# Patient Record
Sex: FEMALE | Race: BLACK OR AFRICAN AMERICAN | Employment: FULL TIME | URBAN - METROPOLITAN AREA
[De-identification: names, ages, dates, MRNs, and addresses within clinical notes are randomized per-mention and may not be internally consistent; named-entity substitution may affect disease eponyms.]

---

## 2017-04-05 ENCOUNTER — ED HISTORICAL/CONVERTED ENCOUNTER (OUTPATIENT)
Dept: OTHER | Age: 52
End: 2017-04-05

## 2017-10-27 ENCOUNTER — ED HISTORICAL/CONVERTED ENCOUNTER (OUTPATIENT)
Dept: OTHER | Age: 52
End: 2017-10-27

## 2018-09-09 ENCOUNTER — ED HISTORICAL/CONVERTED ENCOUNTER (OUTPATIENT)
Dept: OTHER | Age: 53
End: 2018-09-09

## 2019-08-17 ENCOUNTER — ED HISTORICAL/CONVERTED ENCOUNTER (OUTPATIENT)
Dept: OTHER | Age: 54
End: 2019-08-17

## 2022-09-22 ENCOUNTER — APPOINTMENT (OUTPATIENT)
Dept: GENERAL RADIOLOGY | Age: 57
End: 2022-09-22
Attending: STUDENT IN AN ORGANIZED HEALTH CARE EDUCATION/TRAINING PROGRAM
Payer: MEDICAID

## 2022-09-22 ENCOUNTER — HOSPITAL ENCOUNTER (EMERGENCY)
Age: 57
Discharge: HOME | End: 2022-09-22
Attending: STUDENT IN AN ORGANIZED HEALTH CARE EDUCATION/TRAINING PROGRAM
Payer: MEDICAID

## 2022-09-22 VITALS
SYSTOLIC BLOOD PRESSURE: 217 MMHG | HEART RATE: 76 BPM | BODY MASS INDEX: 35.85 KG/M2 | HEIGHT: 64 IN | TEMPERATURE: 99.1 F | DIASTOLIC BLOOD PRESSURE: 127 MMHG | RESPIRATION RATE: 16 BRPM | OXYGEN SATURATION: 100 % | WEIGHT: 210 LBS

## 2022-09-22 DIAGNOSIS — M54.2 MUSCULOSKELETAL NECK PAIN: Primary | ICD-10-CM
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
## 2022-09-22 DIAGNOSIS — R03.0 ELEVATED BLOOD PRESSURE READING: ICD-10-CM

## 2022-09-22 DIAGNOSIS — M54.6 ACUTE RIGHT-SIDED THORACIC BACK PAIN: ICD-10-CM

## 2022-09-22 PROCEDURE — 71046 X-RAY EXAM CHEST 2 VIEWS: CPT

## 2022-09-22 PROCEDURE — 93005 ELECTROCARDIOGRAM TRACING: CPT

## 2022-09-22 PROCEDURE — 99284 EMERGENCY DEPT VISIT MOD MDM: CPT

## 2022-09-22 PROCEDURE — 74011250637 HC RX REV CODE- 250/637: Performed by: STUDENT IN AN ORGANIZED HEALTH CARE EDUCATION/TRAINING PROGRAM

## 2022-09-22 PROCEDURE — 74011000250 HC RX REV CODE- 250: Performed by: STUDENT IN AN ORGANIZED HEALTH CARE EDUCATION/TRAINING PROGRAM

## 2022-09-22 RX ORDER — ACETAMINOPHEN 325 MG/1
975 TABLET ORAL
Status: COMPLETED | OUTPATIENT
Start: 2022-09-22 | End: 2022-09-22

## 2022-09-22 RX ORDER — LIDOCAINE 4 G/100G
1 PATCH TOPICAL EVERY 24 HOURS
Status: DISCONTINUED | OUTPATIENT
Start: 2022-09-22 | End: 2022-09-22 | Stop reason: HOSPADM

## 2022-09-22 RX ADMIN — ACETAMINOPHEN 975 MG: 325 TABLET, FILM COATED ORAL at 16:03
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
## 2022-09-23 LAB
ATRIAL RATE: 60 BPM
CALCULATED P AXIS, ECG09: 28 DEGREES
CALCULATED R AXIS, ECG10: -12 DEGREES
CALCULATED T AXIS, ECG11: 4 DEGREES
DIAGNOSIS, 93000: NORMAL
P-R INTERVAL, ECG05: 160 MS
Q-T INTERVAL, ECG07: 436 MS
QRS DURATION, ECG06: 100 MS
QTC CALCULATION (BEZET), ECG08: 436 MS
VENTRICULAR RATE, ECG03: 60 BPM

## 2022-09-26 ENCOUNTER — HOSPITAL ENCOUNTER (EMERGENCY)
Age: 57
Discharge: HOME | End: 2022-09-26
Attending: STUDENT IN AN ORGANIZED HEALTH CARE EDUCATION/TRAINING PROGRAM
Payer: MEDICAID

## 2022-09-26 VITALS
TEMPERATURE: 98 F | HEART RATE: 50 BPM | BODY MASS INDEX: 35.51 KG/M2 | DIASTOLIC BLOOD PRESSURE: 92 MMHG | SYSTOLIC BLOOD PRESSURE: 163 MMHG | HEIGHT: 64 IN | WEIGHT: 208 LBS | RESPIRATION RATE: 18 BRPM | OXYGEN SATURATION: 97 %

## 2022-09-26 DIAGNOSIS — Z91.14 NONCOMPLIANCE WITH MEDICATION REGIMEN: ICD-10-CM

## 2022-09-26 DIAGNOSIS — I10 HYPERTENSION, UNSPECIFIED TYPE: Primary | ICD-10-CM

## 2022-09-26 PROCEDURE — 99283 EMERGENCY DEPT VISIT LOW MDM: CPT

## 2022-09-26 PROCEDURE — 36415 COLL VENOUS BLD VENIPUNCTURE: CPT

## 2022-09-26 PROCEDURE — 74011250637 HC RX REV CODE- 250/637: Performed by: NURSE PRACTITIONER

## 2022-09-26 PROCEDURE — 74011000250 HC RX REV CODE- 250: Performed by: NURSE PRACTITIONER

## 2022-09-26 RX ORDER — CLONIDINE HYDROCHLORIDE 0.1 MG/1
0.1 TABLET ORAL
Status: COMPLETED | OUTPATIENT
Start: 2022-09-26 | End: 2022-09-26

## 2022-09-26 RX ORDER — LIDOCAINE 4 G/100G
1 PATCH TOPICAL EVERY 24 HOURS
Status: DISCONTINUED | OUTPATIENT
Start: 2022-09-26 | End: 2022-09-27 | Stop reason: HOSPADM

## 2022-09-26 RX ORDER — LIDOCAINE 50 MG/G
PATCH TOPICAL
Qty: 1 EACH | Refills: 0 | Status: SHIPPED | OUTPATIENT
Start: 2022-09-26

## 2022-09-26 RX ORDER — LISINOPRIL 10 MG/1
10 TABLET ORAL DAILY
Qty: 30 TABLET | Refills: 0 | Status: SHIPPED | OUTPATIENT
Start: 2022-09-26 | End: 2022-10-26

## 2022-09-26 RX ORDER — ACETAMINOPHEN 325 MG/1
650 TABLET ORAL
Status: COMPLETED | OUTPATIENT
Start: 2022-09-26 | End: 2022-09-26

## 2022-09-26 RX ADMIN — CLONIDINE HYDROCHLORIDE 0.1 MG: 0.1 TABLET ORAL at 21:33

## 2022-09-26 RX ADMIN — ACETAMINOPHEN 650 MG: 325 TABLET, FILM COATED ORAL at 21:22

## 2022-09-27 NOTE — DISCHARGE INSTRUCTIONS
Check your blood pressure once daily 1-2 hours after your blood pressure medications and record. Follow-up with your primary doctor in 1-2 weeks for blood pressure recheck.
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
## 2022-10-18 ENCOUNTER — HOSPITAL ENCOUNTER (OUTPATIENT)
Dept: PHYSICAL THERAPY | Age: 57
Discharge: HOME | End: 2022-10-18
Payer: MEDICAID

## 2022-10-18 PROCEDURE — 97161 PT EVAL LOW COMPLEX 20 MIN: CPT | Performed by: PHYSICAL THERAPIST
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
## 2022-11-02 ENCOUNTER — HOSPITAL ENCOUNTER (OUTPATIENT)
Dept: PHYSICAL THERAPY | Age: 57
Discharge: HOME | End: 2022-11-02
Payer: MEDICAID

## 2022-11-02 PROCEDURE — 97014 ELECTRIC STIMULATION THERAPY: CPT

## 2022-11-02 PROCEDURE — 97110 THERAPEUTIC EXERCISES: CPT

## 2022-11-02 NOTE — PROGRESS NOTES
PT DAILY TREATMENT NOTE     Patient Name: Uzma Sunshine  Date:2022  : 1965  [x]  Patient  Verified  Payor: [employer]ICA[employer] / Plan: [address] [place] [address] / Product Type: Managed Care Medicaid /    Treatment Area: Other low back pain [M54.59]  Cervicalgia [M54.2]  Sprain of ligaments of lumbar spine, subsequent encounter [S33. 5XXD]   Next MD APPT:   In time:2:30    Out time:3:20  Total Treatment Time (min): 50  Total Timed Codes (min): 45  1:1 Treatment Time ( [address] only): 39   Visit #:     SUBJECTIVE    Any medication changes, allergies to medications, adverse drug reactions, diagnosis change, or new procedure performed?:   [x] No    [] Yes (see summary sheet for update)    Pain Level (0-10 scale) pre treatment: 8                    Pain Level (0-10 scale) post treatment: 7.5    Subjective functional status/changes:   [] No changes reported  Pt reports tightness and pain. Pt states she has attempted to be more mobile by taking her grand kids to school.    OBJECTIVE    Modality rationale: decrease inflammation, decrease pain, increase tissue extensibility, and increase muscle contraction/control to improve the patients ability to reduce pain   Min Type Additional Details   15 [x] Estim: [x]UnAtt   []Att       []TENS instruct                  [x]IFC  []Premod   []NMES                     []Other:  []w/US   []w/ice   [x]w/heat  Position: seated  Location: UT, rhomboid    []  Ice     []  Heat  []  Ice massage Position:  Location:    []  Traction: [] Cervical       []Lumbar                       [] Prone          []Supine                       []Intermittent   []Continuous Lbs:  []w/heat  []W/heat and Estim    []  Ultrasound: []Continuous   [] Pulsed at:                           []1MHz   []3MHz Location:  W/cm2:      [x] Skin assessment post-treatment:   [x]intact  []redness- no adverse reaction   []redness - adverse reaction:     35 min Therapeutic Exercise:  [] See flow sheet : Rationale: increase ROM, increase strength, and improve coordination to improve the patients ability to move without pain   min Manual Therapy:     Rationale: decrease pain, increase ROM, increase tissue extensibility, and decrease trigger points to improve the patients ability to being pain free     min Neuromuscular Re-education:  []  See flow sheet :   Rationale: increase ROM, increase strength, and improve coordination  to improve the patients ability to being pain free      With   [] TE   [] TA   [] Neuro   [] SC   [] other: Patient Education: [] Review HEP    [] Progressed/Changed HEP based on:   [] positioning   [] body mechanics   [] transfers   [] Use of heat/ice    [] other:          Other Objective/Functional Measures:      ASSESSMENT/Changes in Function:   Pt continues to demonstrate antalgic, very slow lia gait pattern with no trunk rotation and arm swing. During session pt was able to complete TE with limited cs, and thoracic ROM and mobility due to pain. Concluded session with E-stim IFC accompanied with HP on UT to alleviate pain symptoms. Pt noted minimal pain reduction. Attempted MT with no avail, causing pain to increase and cause pt to guard shoulders. Stopped MT shortly after. Patient will continue to benefit from skilled PT services to modify and progress therapeutic interventions, address functional mobility deficits, address ROM deficits, address strength deficits, analyze and address soft tissue restrictions, analyze and cue movement patterns, analyze and modify body mechanics/ergonomics, and assess and modify postural abnormalities to attain remaining goals. GOALS/Progress towards goals:  Short Term Goals: To be accomplished in 4 weeks:              1.  Inpd with HEP              2.  Decrease pain to < 5/10 without routine ADL   Long Term Goals: To be accomplished in 8 weeks:              1.  Normal gait on even surfaces. 2. Pain < 2/10 with routine ADL. 3. Full AROM entire spine. Patient Goal (s): get back to being pain free.    PLAN  [x]  Continue plan of care  []  Upgrade activities as tolerated       []  Update interventions per flow sheet       []  Discharge due to:  []  Other:     Ami Nicole, ELEUTERIO 11/2/2022

## 2022-11-08 ENCOUNTER — HOSPITAL ENCOUNTER (OUTPATIENT)
Dept: PHYSICAL THERAPY | Age: 57
Discharge: HOME | End: 2022-11-08
Payer: MEDICAID

## 2022-11-08 PROCEDURE — 97110 THERAPEUTIC EXERCISES: CPT

## 2022-11-08 PROCEDURE — 97014 ELECTRIC STIMULATION THERAPY: CPT

## 2022-11-08 NOTE — PROGRESS NOTES
PT DAILY TREATMENT NOTE     Patient Name: Baljeet Jackson  Date:2022  : 1965  [x]  Patient  Verified  Payor: [employer] / Plan: [address] [place] [address] / Product Type: Managed Care Medicaid /    Treatment Area: Other low back pain [M54.59]  Cervicalgia [M54.2]  Sprain of ligaments of lumbar spine, subsequent encounter [S33. 5XXD]   Next MD APPT:   In time: 245 pm Out time: 340 pm  Total Treatment Time (min): 55  Total Timed Codes (min): 26  1:1 Treatment Time ( only): 426  Visit #: 316    SUBJECTIVE    Any medication changes, allergies to medications, adverse drug reactions, diagnosis change, or new procedure performed?:   [x] No    [] Yes (see summary sheet for update)    Pain Level (0-10 scale) pre treatment: 8                    Pain Level (0-10 scale) post treatment: 7.5    Subjective functional status/changes:   [] No changes reported  Pain is bad.   Difficulty moving  OBJECTIVE    Modality rationale: decrease inflammation, decrease pain, increase tissue extensibility, and increase muscle contraction/control to improve the patients ability to reduce pain   Min Type Additional Details   20 [x] Estim: [x]UnAtt   []Att       []TENS instruct                  [x]IFC  []Premod   []NMES                     []Other:  []w/US   []w/ice   [x]w/heat  Position: seated  Location: UT, rhomboid    []  Ice     []  Heat  []  Ice massage Position:  Location:    []  Traction: [] Cervical       []Lumbar                       [] Prone          []Supine                       []Intermittent   []Continuous Lbs:  []w/heat  []W/heat and Estim    []  Ultrasound: []Continuous   [] Pulsed at:                           []1MHz   []3MHz Location:  W/cm2:      [x] Skin assessment post-treatment:   [x]intact  []redness- no adverse reaction   []redness - adverse reaction:     26 min Therapeutic Exercise:  [] See flow sheet :   Rationale: increase ROM, increase strength, and improve coordination to improve the patients ability to move without pain   min Manual Therapy:     Rationale: decrease pain, increase ROM, increase tissue extensibility, and decrease trigger points to improve the patients ability to being pain free     min Neuromuscular Re-education:  []  See flow sheet :   Rationale: increase ROM, increase strength, and improve coordination  to improve the patients ability to being pain free      With   [] TE   [] TA   [] Neuro   [] SC   [] other: Patient Education: [] Review HEP    [] Progressed/Changed HEP based on:   [] positioning   [] body mechanics   [] transfers   [] Use of heat/ice    [] other:          Other Objective/Functional Measures:      ASSESSMENT/Changes in Function:   Pt continues to demonstrate antalgic, very slow lia gait pattern with no trunk rotation and arm swing. During session pt was able to complete TE with limited cs, and thoracic ROM and mobility due to pain. Concluded session with E-stim IFC accompanied with HP on UT to alleviate pain symptoms. Pt noted minimal pain reduction. Patient will continue to benefit from skilled PT services to modify and progress therapeutic interventions, address functional mobility deficits, address ROM deficits, address strength deficits, analyze and address soft tissue restrictions, analyze and cue movement patterns, analyze and modify body mechanics/ergonomics, and assess and modify postural abnormalities to attain remaining goals. GOALS/Progress towards goals:  Short Term Goals: To be accomplished in 4 weeks:              1.  Inpd with HEP              2.  Decrease pain to < 5/10 without routine ADL   Long Term Goals: To be accomplished in 8 weeks:              1.  Normal gait on even surfaces. 2. Pain < 2/10 with routine ADL. 3. Full AROM entire spine. Patient Goal (s): get back to being pain free.    PLAN  [x]  Continue plan of care  []  Upgrade activities as tolerated       []  Update interventions per flow sheet       []  Discharge due to:  []  Other:     Yamile Covarrubias, PT 11/8/2022

## 2022-11-10 ENCOUNTER — HOSPITAL ENCOUNTER (OUTPATIENT)
Dept: PHYSICAL THERAPY | Age: 57
Discharge: HOME | End: 2022-11-10
Payer: MEDICAID

## 2022-11-10 PROCEDURE — 97110 THERAPEUTIC EXERCISES: CPT

## 2022-11-10 NOTE — PROGRESS NOTES
PT DAILY TREATMENT NOTE     Patient Name: Ross Urban  Date:11/10/2022  : 1965  [x]  Patient  Verified  Payor: [employer] / Plan: [address] [place] [address] / Product Type: Managed Care Medicaid /    Treatment Area: Other low back pain [M54.59]  Cervicalgia [M54.2]  Sprain of ligaments of lumbar spine, subsequent encounter [S33. 5XXD]   Next MD APPT:   In time: 330pm Out time: 410 pm  Total Treatment Time (min): 40  Total Timed Codes (min): 40  1:1 Treatment Time ([facility] only): 40  Visit #: 4    SUBJECTIVE    Any medication changes, allergies to medications, adverse drug reactions, diagnosis change, or new procedure performed?:   [x] No    [] Yes (see summary sheet for update)    Pain Level (0-10 scale) pre treatment: 8                    Pain Level (0-10 scale) post treatment: 8    Subjective functional status/changes:   [] No changes reported  \"LAST SESSION WAS TOO HARD; LEFT ME SORE. \"  OBJECTIVE    Modality rationale: decrease inflammation, decrease pain, increase tissue extensibility, and increase muscle contraction/control to improve the patients ability to reduce pain   Min Type Additional Details    [x] Estim: [x]UnAtt   []Att       []TENS instruct                  [x]IFC  []Premod   []NMES                     []Other:  []w/US   []w/ice   [x]w/heat  Position: seated  Location: UT, rhomboid    []  Ice     []  Heat  []  Ice massage Position:  Location:    []  Traction: [] Cervical       []Lumbar                       [] Prone          []Supine                       []Intermittent   []Continuous Lbs:  []w/heat  []W/heat and Estim    []  Ultrasound: []Continuous   [] Pulsed at:                           []1MHz   []3MHz Location:  W/cm2:      [x] Skin assessment post-treatment:   [x]intact  []redness- no adverse reaction   []redness - adverse reaction:     40 min Therapeutic Exercise:  [] See flow sheet :   Rationale: increase ROM, increase strength, and improve coordination to improve the patients ability to move without pain   min Manual Therapy:     Rationale: decrease pain, increase ROM, increase tissue extensibility, and decrease trigger points to improve the patients ability to being pain free     min Neuromuscular Re-education:  []  See flow sheet :   Rationale: increase ROM, increase strength, and improve coordination  to improve the patients ability to being pain free      With   [] TE   [] TA   [] Neuro   [] SC   [] other: Patient Education: [] Review HEP    [] Progressed/Changed HEP based on:   [] positioning   [] body mechanics   [] transfers   [] Use of heat/ice    [] other:          Other Objective/Functional Measures:      ASSESSMENT/Changes in Function   Pt continues to demonstrate antalgic, very slow lia gait pattern with no trunk rotation and arm swing. During session pt was able to complete exercises slowly. Patient will continue to benefit from skilled PT services to modify and progress therapeutic interventions, address functional mobility deficits, address ROM deficits, address strength deficits, analyze and address soft tissue restrictions, analyze and cue movement patterns, analyze and modify body mechanics/ergonomics, and assess and modify postural abnormalities to attain remaining goals. GOALS/Progress towards goals:  Short Term Goals: To be accomplished in 4 weeks:              1.  Inpd with HEP              2.  Decrease pain to < 5/10 without routine ADL   Long Term Goals: To be accomplished in 8 weeks:              1.  Normal gait on even surfaces. 2. Pain < 2/10 with routine ADL. 3. Full AROM entire spine. Patient Goal (s): get back to being pain free.    PLAN  [x]  Continue plan of care  []  Upgrade activities as tolerated       []  Update interventions per flow sheet       []  Discharge due to:  []  Other:     Sonido Arguello, PT 11/10/2022

## 2022-11-14 ENCOUNTER — HOSPITAL ENCOUNTER (OUTPATIENT)
Dept: PHYSICAL THERAPY | Age: 57
Discharge: HOME | End: 2022-11-14
Payer: MEDICAID

## 2022-11-14 PROCEDURE — 97110 THERAPEUTIC EXERCISES: CPT

## 2022-11-14 PROCEDURE — 97014 ELECTRIC STIMULATION THERAPY: CPT

## 2022-11-14 NOTE — PROGRESS NOTES
PT DAILY TREATMENT NOTE     Patient Name: Marysol Fink  Date:2022  : 1965  [x]  Patient  Verified  Payor: [employer] / Plan: [address] [place] [address] / Product Type: Managed Care Medicaid /    Treatment Area: Other low back pain [M54.59]  Cervicalgia [M54.2]  Sprain of ligaments of lumbar spine, subsequent encounter [S33. 5XXD]   Next MD APPT:   In time: 230pm   Out time: 320 pm  Total Treatment Time (min): 50  Total Timed Codes (min): 38  1:1 Treatment Time ([facility] only): 38  Visit #: 5    SUBJECTIVE    Any medication changes, allergies to medications, adverse drug reactions, diagnosis change, or new procedure performed?:   [x] No    [] Yes (see summary sheet for update)    Pain Level (0-10 scale) pre treatment: 8                    Pain Level (0-10 scale) post treatment: 7    Subjective functional status/changes:   [] No changes reported  \"LAST SESSION WAS TOO HARD; LEFT ME SORE. \"    OBJECTIVE    Modality rationale: decrease inflammation, decrease pain, increase tissue extensibility, and increase muscle contraction/control to improve the patients ability to reduce pain   Min Type Additional Details   10 [x] Estim: [x]UnAtt   []Att       []TENS instruct                  [x]IFC  []Premod   []NMES                     []Other:  []w/US   []w/ice   [x]w/heat  Position: seated  Location: UT, rhomboid    []  Ice     []  Heat  []  Ice massage Position:  Location:    []  Traction: [] Cervical       []Lumbar                       [] Prone          []Supine                       []Intermittent   []Continuous Lbs:  []w/heat  []W/heat and Estim    []  Ultrasound: []Continuous   [] Pulsed at:                           []1MHz   []3MHz Location:  W/cm2:      [x] Skin assessment post-treatment:   [x]intact  []redness- no adverse reaction   []redness - adverse reaction:     28 min Therapeutic Exercise:  [] See flow sheet :   Rationale: increase ROM, increase strength, and improve coordination to improve the patients ability to move without pain   min Manual Therapy:     Rationale: decrease pain, increase ROM, increase tissue extensibility, and decrease trigger points to improve the patients ability to being pain free     min Neuromuscular Re-education:  []  See flow sheet :   Rationale: increase ROM, increase strength, and improve coordination  to improve the patients ability to being pain free      With   [] TE   [] TA   [] Neuro   [] SC   [] other: Patient Education: [] Review HEP    [] Progressed/Changed HEP based on:   [] positioning   [] body mechanics   [] transfers   [] Use of heat/ice    [] other:          Other Objective/Functional Measures:      ASSESSMENT/Changes in Function   Pt continues to demonstrate antalgic, very slow lia gait pattern with no trunk rotation and arm swing. During session pt was able to complete exercises slowly. Patient will continue to benefit from skilled PT services to modify and progress therapeutic interventions, address functional mobility deficits, address ROM deficits, address strength deficits, analyze and address soft tissue restrictions, analyze and cue movement patterns, analyze and modify body mechanics/ergonomics, and assess and modify postural abnormalities to attain remaining goals. GOALS/Progress towards goals:  Short Term Goals: To be accomplished in 4 weeks:              1.  Inpd with HEP              2.  Decrease pain to < 5/10 without routine ADL   Long Term Goals: To be accomplished in 8 weeks:              1.  Normal gait on even surfaces. 2. Pain < 2/10 with routine ADL. 3. Full AROM entire spine. Patient Goal (s): get back to being pain free.    PLAN  [x]  Continue plan of care  []  Upgrade activities as tolerated       []  Update interventions per flow sheet       []  Discharge due to:  []  Other:     Sidney Encinas, PTA 11/14/2022

## 2022-11-16 ENCOUNTER — HOSPITAL ENCOUNTER (OUTPATIENT)
Dept: PHYSICAL THERAPY | Age: 57
Discharge: HOME | End: 2022-11-16
Payer: MEDICAID

## 2022-11-16 PROCEDURE — 97110 THERAPEUTIC EXERCISES: CPT

## 2022-11-16 PROCEDURE — 97014 ELECTRIC STIMULATION THERAPY: CPT

## 2022-11-16 NOTE — PROGRESS NOTES
PT DAILY TREATMENT NOTE     Patient Name: Ana Paula Ma  Date:2022  : 1965  [x]  Patient  Verified  Payor: [employer]ICA[employer] / Plan: [address] [place] [address] / Product Type: Managed Care Medicaid /    Treatment Area: Other low back pain [M54.59]  Cervicalgia [M54.2]  Sprain of ligaments of lumbar spine, subsequent encounter [S33. 5XXD]   Next MD APPT:   In time: 230pm   Out time: 320 pm  Total Treatment Time (min): 50  Total Timed Codes (min): 40  1:1 Treatment Time ([facility] only): 40  Visit #: 6    SUBJECTIVE    Any medication changes, allergies to medications, adverse drug reactions, diagnosis change, or new procedure performed?:   [x] No    [] Yes (see summary sheet for update)    Pain Level (0-10 scale) pre treatment: 7                Pain Level (0-10 scale) post treatment: 7    Subjective functional status/changes:   [] No changes reported  Pt states that she is feeling a little bit better since beginning PT, and that she can feel a little progress being made. Pt reports that she is at 80% improvement, and that she still has some pain which is limiting her from reaching 100%.        OBJECTIVE    Modality rationale: decrease inflammation, decrease pain, increase tissue extensibility, and increase muscle contraction/control to improve the patients ability to reduce pain   Min Type Additional Details   10 [x] Estim: [x]UnAtt   []Att       []TENS instruct                  [x]IFC  []Premod   []NMES                     []Other:  []w/US   []w/ice   [x]w/heat  Position: seated  Location: UT, rhomboid    []  Ice     []  Heat  []  Ice massage Position:  Location:    []  Traction: [] Cervical       []Lumbar                       [] Prone          []Supine                       []Intermittent   []Continuous Lbs:  []w/heat  []W/heat and Estim    []  Ultrasound: []Continuous   [] Pulsed at:                           []1MHz   []3MHz Location:  W/cm2:      [x] Skin assessment post-treatment:   [x]intact []redness- no adverse reaction   []redness - adverse reaction:     30 min Therapeutic Exercise:  [x] See flow sheet :   Rationale: increase ROM, increase strength, and improve coordination to improve the patients ability to move without pain   min Manual Therapy:     Rationale: decrease pain, increase ROM, increase tissue extensibility, and decrease trigger points to improve the patients ability to being pain free     min Neuromuscular Re-education:  []  See flow sheet :   Rationale: increase ROM, increase strength, and improve coordination  to improve the patients ability to being pain free      With   [] TE   [] TA   [] Neuro   [] SC   [] other: Patient Education: [] Review HEP    [] Progressed/Changed HEP based on:   [] positioning   [] body mechanics   [] transfers   [] Use of heat/ice    [] other:          Other Objective/Functional Measures:    AMPAC: 35.72%    ASSESSMENT/Changes in Function   Pt has completed six sessions of PT and is making steady progress. Pt has been completing her HEP daily, which has helped with her progress. Pt continues to ambulate with an antalgic gait, and reduced arm swing. Patient will continue to benefit from skilled PT services to modify and progress therapeutic interventions, address functional mobility deficits, address ROM deficits, address strength deficits, analyze and address soft tissue restrictions, analyze and cue movement patterns, analyze and modify body mechanics/ergonomics, and assess and modify postural abnormalities to attain remaining goals. GOALS/Progress towards goals:  Short Term Goals: To be accomplished in 4 weeks:              1.  Inpd with HEP Met 11/16              2.  Decrease pain to < 5/10 without routine ADL   Long Term Goals: To be accomplished in 8 weeks:              1.  Normal gait on even surfaces. 2. Pain < 2/10 with routine ADL. 3. Full AROM entire spine. Patient Goal (s): get back to being pain free.   PLAN  [x]  Continue plan of care  []  Upgrade activities as tolerated       []  Update interventions per flow sheet       []  Discharge due to:  []  Other:     Terry Chang., PTA 11/16/2022

## 2022-11-21 ENCOUNTER — HOSPITAL ENCOUNTER (OUTPATIENT)
Dept: PHYSICAL THERAPY | Age: 57
Discharge: HOME | End: 2022-11-21
Payer: MEDICAID

## 2022-11-21 PROCEDURE — 97014 ELECTRIC STIMULATION THERAPY: CPT

## 2022-11-21 PROCEDURE — 97110 THERAPEUTIC EXERCISES: CPT

## 2022-11-21 NOTE — PROGRESS NOTES
PT DAILY TREATMENT NOTE     Patient Name: Anna Fields  Date:2022  : 1965  [x]  Patient  Verified  Payor: [employer]ICAID / Plan: [address] [place] [address] / Product Type: Managed Care Medicaid /    Treatment Area: Other low back pain [M54.59]  Cervicalgia [M54.2]  Sprain of ligaments of lumbar spine, subsequent encounter [S33. 5XXD]   Next MD APPT:   In time: 230pm   Out time: 330 pm  Total Treatment Time (min): 60  Total Timed Codes (min): 48  1:1 Treatment Time ([facility] only): 48  Visit #: 7    SUBJECTIVE    Any medication changes, allergies to medications, adverse drug reactions, diagnosis change, or new procedure performed?:   [x] No    [] Yes (see summary sheet for update)    Pain Level (0-10 scale) pre treatment: 7                Pain Level (0-10 scale) post treatment: 7    Subjective functional status/changes:   [] No changes reported  Pt reports that she is having less symptoms within her neck and that her low back is more bothersome.        OBJECTIVE    Modality rationale: decrease inflammation, decrease pain, increase tissue extensibility, and increase muscle contraction/control to improve the patients ability to reduce pain   Min Type Additional Details   15 [x] Estim: [x]UnAtt   []Att       []TENS instruct                  [x]IFC  []Premod   []NMES                     []Other:  []w/US   []w/ice   [x]w/heat  Position: seated  Location: lumbar paraspinals    []  Ice     []  Heat  []  Ice massage Position:  Location:    []  Traction: [] Cervical       []Lumbar                       [] Prone          []Supine                       []Intermittent   []Continuous Lbs:  []w/heat  []W/heat and Estim    []  Ultrasound: []Continuous   [] Pulsed at:                           []1MHz   []3MHz Location:  W/cm2:      [x] Skin assessment post-treatment:   [x]intact  []redness- no adverse reaction   []redness - adverse reaction:     33 min Therapeutic Exercise:  [x] See flow sheet :   Rationale: increase ROM, increase strength, and improve coordination to improve the patients ability to move without pain   min Manual Therapy:     Rationale: decrease pain, increase ROM, increase tissue extensibility, and decrease trigger points to improve the patients ability to being pain free     min Neuromuscular Re-education:  []  See flow sheet :   Rationale: increase ROM, increase strength, and improve coordination  to improve the patients ability to being pain free      With   [] TE   [] TA   [] Neuro   [] SC   [] other: Patient Education: [] Review HEP    [] Progressed/Changed HEP based on:   [] positioning   [] body mechanics   [] transfers   [] Use of heat/ice    [] other:          Other Objective/Functional Measures:        ASSESSMENT/Changes in Function   Pt tolerated session without any increase of symptoms. Pt required TC and VC to perform pelvic tilts, and was able to complete activity without further cueing. Pt given HEP and was able to verbalize understanding of completion once demo was provided. Patient will continue to benefit from skilled PT services to modify and progress therapeutic interventions, address functional mobility deficits, address ROM deficits, address strength deficits, analyze and address soft tissue restrictions, analyze and cue movement patterns, analyze and modify body mechanics/ergonomics, and assess and modify postural abnormalities to attain remaining goals. GOALS/Progress towards goals:  Short Term Goals: To be accomplished in 4 weeks:              1.  Inpd with HEP Met 11/16              2.  Decrease pain to < 5/10 without routine ADL   Long Term Goals: To be accomplished in 8 weeks:              1.  Normal gait on even surfaces. 2. Pain < 2/10 with routine ADL. 3. Full AROM entire spine. Patient Goal (s): get back to being pain free.    PLAN  [x]  Continue plan of care  []  Upgrade activities as tolerated       []  Update interventions per flow sheet       []  Discharge due to:  []  Other:     Kristin Garza., PTA 11/21/2022

## 2022-11-23 ENCOUNTER — HOSPITAL ENCOUNTER (OUTPATIENT)
Dept: PHYSICAL THERAPY | Age: 57
Discharge: HOME | End: 2022-11-23
Payer: MEDICAID

## 2022-11-23 PROCEDURE — 97014 ELECTRIC STIMULATION THERAPY: CPT

## 2022-11-23 PROCEDURE — 97110 THERAPEUTIC EXERCISES: CPT

## 2022-11-23 NOTE — PROGRESS NOTES
PT DAILY TREATMENT NOTE     Patient Name: Ana Paula Ma  Date:2022  : 1965  [x]  Patient  Verified  Payor: [employer] / Plan: [address] [place] [address] / Product Type: Managed Care Medicaid /    Treatment Area: Other low back pain [M54.59]  Cervicalgia [M54.2]  Sprain of ligaments of lumbar spine, subsequent encounter [S33. 5XXD]   Next MD APPT:   In time: 230pm   Out time: 320 pm  Total Treatment Time (min): 50  Total Timed Codes (min): 44  1:1 Treatment Time (MC only): 40  Visit #: 8    SUBJECTIVE    Any medication changes, allergies to medications, adverse drug reactions, diagnosis change, or new procedure performed?:   [x] No    [] Yes (see summary sheet for update)    Pain Level (0-10 scale) pre treatment: 7                Pain Level (0-10 scale) post treatment: 7    Subjective functional status/changes:   [] No changes reported  Pt reports that she is having less symptoms within her neck and that her low back is more bothersome.        OBJECTIVE    Modality rationale: decrease inflammation, decrease pain, increase tissue extensibility, and increase muscle contraction/control to improve the patients ability to reduce pain   Min Type Additional Details   15 [x] Estim: [x]UnAtt   []Att       []TENS instruct                  [x]IFC  []Premod   []NMES                     []Other:  []w/US   []w/ice   [x]w/heat  Position: seated  Location: lumbar paraspinals    []  Ice     []  Heat  []  Ice massage Position:  Location:    []  Traction: [] Cervical       []Lumbar                       [] Prone          []Supine                       []Intermittent   []Continuous Lbs:  []w/heat  []W/heat and Estim    []  Ultrasound: []Continuous   [] Pulsed at:                           []1MHz   []3MHz Location:  W/cm2:      [x] Skin assessment post-treatment:   [x]intact  []redness- no adverse reaction   []redness - adverse reaction:     29 min Therapeutic Exercise:  [x] See flow sheet :   Rationale: increase ROM, increase strength, and improve coordination to improve the patients ability to move without pain   min Manual Therapy:     Rationale: decrease pain, increase ROM, increase tissue extensibility, and decrease trigger points to improve the patients ability to being pain free     min Neuromuscular Re-education:  []  See flow sheet :   Rationale: increase ROM, increase strength, and improve coordination  to improve the patients ability to being pain free      With   [] TE   [] TA   [] Neuro   [] SC   [] other: Patient Education: [] Review HEP    [] Progressed/Changed HEP based on:   [] positioning   [] body mechanics   [] transfers   [] Use of heat/ice    [] other:          Other Objective/Functional Measures:        ASSESSMENT/Changes in Function   Pt tolerated session without any increase of symptoms. Pt required TC and VC to perform pelvic tilts, and was able to complete activity without further cueing. Pt given HEP and was able to verbalize understanding of completion once demo was provided. Patient will continue to benefit from skilled PT services to modify and progress therapeutic interventions, address functional mobility deficits, address ROM deficits, address strength deficits, analyze and address soft tissue restrictions, analyze and cue movement patterns, analyze and modify body mechanics/ergonomics, and assess and modify postural abnormalities to attain remaining goals. GOALS/Progress towards goals:  Short Term Goals: To be accomplished in 4 weeks:              1.  Inpd with HEP Met 11/16              2.  Decrease pain to < 5/10 without routine ADL   Long Term Goals: To be accomplished in 8 weeks:              1.  Normal gait on even surfaces. 2. Pain < 2/10 with routine ADL. 3. Full AROM entire spine. Patient Goal (s): get back to being pain free.    PLAN  [x]  Continue plan of care  []  Upgrade activities as tolerated       []  Update interventions per flow sheet       []  Discharge due to:  []  Other:     Cortes Gallegos, PTA 11/23/2022

## 2022-11-29 ENCOUNTER — HOSPITAL ENCOUNTER (OUTPATIENT)
Dept: PHYSICAL THERAPY | Age: 57
End: 2022-11-29
Payer: MEDICAID

## 2022-11-29 PROCEDURE — 97014 ELECTRIC STIMULATION THERAPY: CPT

## 2022-11-29 PROCEDURE — 97110 THERAPEUTIC EXERCISES: CPT

## 2022-11-29 NOTE — PROGRESS NOTES
PT DAILY TREATMENT NOTE     Patient Name: Aleja Del Real  Date:2022  : 1965  [x]  Patient  Verified  Payor: [employer] / Plan: [address] [place] [address] / Product Type: Managed Care Medicaid /    Treatment Area: Other low back pain [M54.59]  Cervicalgia [M54.2]  Sprain of ligaments of lumbar spine, subsequent encounter [S33. 5XXD]   Next MD APPT:   In time: 230pm   Out time: 325 pm  Total Treatment Time (min): 55  Total Timed Codes (min): 43  1:1 Treatment Time (MC only): 37  Visit #: 9    SUBJECTIVE    Any medication changes, allergies to medications, adverse drug reactions, diagnosis change, or new procedure performed?:   [x] No    [] Yes (see summary sheet for update)    Pain Level (0-10 scale) pre treatment: 7                Pain Level (0-10 scale) post treatment: 6    Subjective functional status/changes:   [] No changes reported  Pt states that she climbed a lot of steps over the weekend and is a little sore today.      OBJECTIVE    Modality rationale: decrease inflammation, decrease pain, increase tissue extensibility, and increase muscle contraction/control to improve the patients ability to reduce pain   Min Type Additional Details   15 [x] Estim: [x]UnAtt   []Att       []TENS instruct                  [x]IFC  []Premod   []NMES                     []Other:  []w/US   []w/ice   [x]w/heat  Position: seated  Location: lumbar paraspinals    []  Ice     []  Heat  []  Ice massage Position:  Location:    []  Traction: [] Cervical       []Lumbar                       [] Prone          []Supine                       []Intermittent   []Continuous Lbs:  []w/heat  []W/heat and Estim    []  Ultrasound: []Continuous   [] Pulsed at:                           []1MHz   []3MHz Location:  W/cm2:      [x] Skin assessment post-treatment:   [x]intact  []redness- no adverse reaction   []redness - adverse reaction:     28 min Therapeutic Exercise:  [x] See flow sheet :   Rationale: increase ROM, increase strength, and improve coordination to improve the patients ability to move without pain   min Manual Therapy:     Rationale: decrease pain, increase ROM, increase tissue extensibility, and decrease trigger points to improve the patients ability to being pain free     min Neuromuscular Re-education:  []  See flow sheet :   Rationale: increase ROM, increase strength, and improve coordination  to improve the patients ability to being pain free      With   [] TE   [] TA   [] Neuro   [] SC   [] other: Patient Education: [] Review HEP    [] Progressed/Changed HEP based on:   [] positioning   [] body mechanics   [] transfers   [] Use of heat/ice    [] other:          Other Objective/Functional Measures:        ASSESSMENT/Changes in Function   Pt tolerated session without any increase of symptoms. Pt able to perform new activities to increase lumbar ROM, and was able to complete them without any difficulty or increase of pain. Patient will continue to benefit from skilled PT services to modify and progress therapeutic interventions, address functional mobility deficits, address ROM deficits, address strength deficits, analyze and address soft tissue restrictions, analyze and cue movement patterns, analyze and modify body mechanics/ergonomics, and assess and modify postural abnormalities to attain remaining goals. GOALS/Progress towards goals:  Short Term Goals: To be accomplished in 4 weeks:              1.  Inpd with HEP Met 11/16              2.  Decrease pain to < 5/10 without routine ADL   Long Term Goals: To be accomplished in 8 weeks:              1.  Normal gait on even surfaces. 2. Pain < 2/10 with routine ADL. 3. Full AROM entire spine. Patient Goal (s): get back to being pain free.    PLAN  [x]  Continue plan of care  []  Upgrade activities as tolerated       []  Update interventions per flow sheet       []  Discharge due to:  []  Other:     Jeff Yee., PTA 11/29/2022

## 2022-12-02 ENCOUNTER — HOSPITAL ENCOUNTER (OUTPATIENT)
Dept: PHYSICAL THERAPY | Age: 57
Discharge: HOME | End: 2022-12-02
Payer: MEDICAID

## 2022-12-02 PROCEDURE — 97014 ELECTRIC STIMULATION THERAPY: CPT

## 2022-12-02 PROCEDURE — 97110 THERAPEUTIC EXERCISES: CPT

## 2022-12-02 NOTE — PROGRESS NOTES
PT DAILY TREATMENT NOTE     Patient Name: Justine Matos  Date:2022  : 1965  [x]  Patient  Verified  Payor: [employer] / Plan: [address] [place] [address] / Product Type: Managed Care Medicaid /    Treatment Area: Other low back pain [M54.59]  Cervicalgia [M54.2]  Sprain of ligaments of lumbar spine, subsequent encounter [S33. 5XXD]   Next MD APPT:   In time: 230pm   Out time: 330 pm  Total Treatment Time (min): 60  Total Timed Codes (min): 54  1:1 Treatment Time (MC only): 47  Visit #: 10    SUBJECTIVE    Any medication changes, allergies to medications, adverse drug reactions, diagnosis change, or new procedure performed?:   [x] No    [] Yes (see summary sheet for update)    Pain Level (0-10 scale) pre treatment: 6               Pain Level (0-10 scale) post treatment: 5    Subjective functional status/changes:   [] No changes reported  Pt reports that her neck is bothering her more today, but says she has been running errands all day.      OBJECTIVE    Modality rationale: decrease inflammation, decrease pain, increase tissue extensibility, and increase muscle contraction/control to improve the patients ability to reduce pain   Min Type Additional Details   20 [x] Estim: [x]UnAtt   []Att       []TENS instruct                  []IFC  [x]Premod   []NMES                     []Other:  []w/US   []w/ice   [x]w/heat  Position: seated  Location: lumbar paraspinals, B UT    []  Ice     []  Heat  []  Ice massage Position:  Location:    []  Traction: [] Cervical       []Lumbar                       [] Prone          []Supine                       []Intermittent   []Continuous Lbs:  []w/heat  []W/heat and Estim    []  Ultrasound: []Continuous   [] Pulsed at:                           []1MHz   []3MHz Location:  W/cm2:      [x] Skin assessment post-treatment:   [x]intact  []redness- no adverse reaction   []redness - adverse reaction:     34 min Therapeutic Exercise:  [x] See flow sheet : Rationale: increase ROM, increase strength, and improve coordination to improve the patients ability to move without pain   min Manual Therapy:     Rationale: decrease pain, increase ROM, increase tissue extensibility, and decrease trigger points to improve the patients ability to being pain free     min Neuromuscular Re-education:  []  See flow sheet :   Rationale: increase ROM, increase strength, and improve coordination  to improve the patients ability to being pain free      With   [] TE   [] TA   [] Neuro   [] SC   [] other: Patient Education: [] Review HEP    [] Progressed/Changed HEP based on:   [] positioning   [] body mechanics   [] transfers   [] Use of heat/ice    [] other:          Other Objective/Functional Measures:        ASSESSMENT/Changes in Function   Pt tolerated session without any increase of symptoms. Pt able to perform new activities to increase lumbar ROM, and was able to complete them without any difficulty or increase of pain. Patient will continue to benefit from skilled PT services to modify and progress therapeutic interventions, address functional mobility deficits, address ROM deficits, address strength deficits, analyze and address soft tissue restrictions, analyze and cue movement patterns, analyze and modify body mechanics/ergonomics, and assess and modify postural abnormalities to attain remaining goals. GOALS/Progress towards goals:  Short Term Goals: To be accomplished in 4 weeks:              1.  Inpd with HEP Met 11/16              2.  Decrease pain to < 5/10 without routine ADL   Long Term Goals: To be accomplished in 8 weeks:              1.  Normal gait on even surfaces. 2. Pain < 2/10 with routine ADL. 3. Full AROM entire spine. Patient Goal (s): get back to being pain free.    PLAN  [x]  Continue plan of care  []  Upgrade activities as tolerated       []  Update interventions per flow sheet       []  Discharge due to:  []  Other: Robles Jackson., PTA 12/2/2022

## 2022-12-05 ENCOUNTER — APPOINTMENT (OUTPATIENT)
Dept: PHYSICAL THERAPY | Age: 57
End: 2022-12-05
Payer: MEDICAID

## 2022-12-07 ENCOUNTER — HOSPITAL ENCOUNTER (OUTPATIENT)
Dept: PHYSICAL THERAPY | Age: 57
Discharge: HOME | End: 2022-12-07
Payer: MEDICAID

## 2022-12-07 PROCEDURE — 97110 THERAPEUTIC EXERCISES: CPT

## 2022-12-07 PROCEDURE — 97014 ELECTRIC STIMULATION THERAPY: CPT

## 2022-12-07 NOTE — PROGRESS NOTES
PT DAILY TREATMENT NOTE     Patient Name: Baljeet Jackson  Date:2022  : 1965  [x]  Patient  Verified  Payor: [employer] / Plan: [address] [place] [address] / Product Type: Managed Care Medicaid /    Treatment Area: Other low back pain [M54.59]  Cervicalgia [M54.2]  Sprain of ligaments of lumbar spine, subsequent encounter [S33. 5XXD]   Next MD APPT:   In time: 230pm   Out time: 335 pm  Total Treatment Time (min): 65  Total Timed Codes (min): 44  1:1 Treatment Time (MC only): 40  Visit #: 11    SUBJECTIVE    Any medication changes, allergies to medications, adverse drug reactions, diagnosis change, or new procedure performed?:   [x] No    [] Yes (see summary sheet for update)    Pain Level (0-10 scale) pre treatment: 5             Pain Level (0-10 scale) post treatment: 4    Subjective functional status/changes:   [] No changes reported  Pt states that she is feeling a little better.      OBJECTIVE    Modality rationale: decrease inflammation, decrease pain, increase tissue extensibility, and increase muscle contraction/control to improve the patients ability to reduce pain   Min Type Additional Details   15 [x] Estim: [x]UnAtt   []Att       []TENS instruct                  []IFC  [x]Premod   []NMES                     []Other:  []w/US   []w/ice   [x]w/heat  Position: seated  Location: lumbar paraspinals, B UT    []  Ice     []  Heat  []  Ice massage Position:  Location:    []  Traction: [] Cervical       []Lumbar                       [] Prone          []Supine                       []Intermittent   []Continuous Lbs:  []w/heat  []W/heat and Estim    []  Ultrasound: []Continuous   [] Pulsed at:                           []1MHz   []3MHz Location:  W/cm2:      [x] Skin assessment post-treatment:   [x]intact  []redness- no adverse reaction   []redness - adverse reaction:     29 min Therapeutic Exercise:  [x] See flow sheet :   Rationale: increase ROM, increase strength, and improve coordination to improve the patients ability to move without pain   min Manual Therapy:     Rationale: decrease pain, increase ROM, increase tissue extensibility, and decrease trigger points to improve the patients ability to being pain free     min Neuromuscular Re-education:  []  See flow sheet :   Rationale: increase ROM, increase strength, and improve coordination  to improve the patients ability to being pain free      With   [] TE   [] TA   [] Neuro   [] SC   [] other: Patient Education: [] Review HEP    [] Progressed/Changed HEP based on:   [] positioning   [] body mechanics   [] transfers   [] Use of heat/ice    [] other:          Other Objective/Functional Measures:        ASSESSMENT/Changes in Function   Pt tolerated session without any increase of symptoms. Pt able to perform new activities to increase lumbar ROM, and was able to complete them without any difficulty or increase of pain. Patient will continue to benefit from skilled PT services to modify and progress therapeutic interventions, address functional mobility deficits, address ROM deficits, address strength deficits, analyze and address soft tissue restrictions, analyze and cue movement patterns, analyze and modify body mechanics/ergonomics, and assess and modify postural abnormalities to attain remaining goals. GOALS/Progress towards goals:  Short Term Goals: To be accomplished in 4 weeks:              1.  Inpd with HEP Met 11/16              2.  Decrease pain to < 5/10 without routine ADL   Long Term Goals: To be accomplished in 8 weeks:              1.  Normal gait on even surfaces. 2. Pain < 2/10 with routine ADL. 3. Full AROM entire spine. Patient Goal (s): get back to being pain free.    PLAN  [x]  Continue plan of care  []  Upgrade activities as tolerated       []  Update interventions per flow sheet       []  Discharge due to:  []  Other:     Sabrina Garcia, PTA 12/7/2022

## 2022-12-12 ENCOUNTER — HOSPITAL ENCOUNTER (OUTPATIENT)
Dept: PHYSICAL THERAPY | Age: 57
Discharge: HOME | End: 2022-12-12
Payer: MEDICAID

## 2022-12-12 PROCEDURE — 97014 ELECTRIC STIMULATION THERAPY: CPT

## 2022-12-12 PROCEDURE — 97110 THERAPEUTIC EXERCISES: CPT

## 2022-12-12 NOTE — PROGRESS NOTES
PT DAILY TREATMENT NOTE     Patient Name: [place] Garcia  Date:2022  : 1965  [x]  Patient  Verified  Payor: [employer] / Plan: [address] [place] [address] / Product Type: Managed Care Medicaid /    Treatment Area: Other low back pain [M54.59]  Cervicalgia [M54.2]  Sprain of ligaments of lumbar spine, subsequent encounter [S33. 5XXD]   Next MD APPT:   In time: 230pm   Out time: 335 pm  Total Treatment Time (min): 65  Total Timed Codes (min): 57  1:1 Treatment Time (MC only): 62  Visit #: 12    SUBJECTIVE    Any medication changes, allergies to medications, adverse drug reactions, diagnosis change, or new procedure performed?:   [x] No    [] Yes (see summary sheet for update)    Pain Level (0-10 scale) pre treatment: 4           Pain Level (0-10 scale) post treatment: 3    Subjective functional status/changes:   [] No changes reported  Pt states that she is feeling a little better.      OBJECTIVE    Modality rationale: decrease inflammation, decrease pain, increase tissue extensibility, and increase muscle contraction/control to improve the patients ability to reduce pain   Min Type Additional Details   20 [x] Estim: [x]UnAtt   []Att       []TENS instruct                  []IFC  [x]Premod   []NMES                     []Other:  []w/US   []w/ice   [x]w/heat  Position: seated  Location: lumbar paraspinals, B UT    []  Ice     []  Heat  []  Ice massage Position:  Location:    []  Traction: [] Cervical       []Lumbar                       [] Prone          []Supine                       []Intermittent   []Continuous Lbs:  []w/heat  []W/heat and Estim    []  Ultrasound: []Continuous   [] Pulsed at:                           []1MHz   []3MHz Location:  W/cm2:      [x] Skin assessment post-treatment:   [x]intact  []redness- no adverse reaction   []redness - adverse reaction:     37 min Therapeutic Exercise:  [x] See flow sheet :   Rationale: increase ROM, increase strength, and improve coordination to improve the patients ability to move without pain   min Manual Therapy:     Rationale: decrease pain, increase ROM, increase tissue extensibility, and decrease trigger points to improve the patients ability to being pain free     min Neuromuscular Re-education:  []  See flow sheet :   Rationale: increase ROM, increase strength, and improve coordination  to improve the patients ability to being pain free      With   [] TE   [] TA   [] Neuro   [] SC   [] other: Patient Education: [] Review HEP    [] Progressed/Changed HEP based on:   [] positioning   [] body mechanics   [] transfers   [] Use of heat/ice    [] other:          Other Objective/Functional Measures:        ASSESSMENT/Changes in Function   Pt tolerated session without any increase of symptoms. Pt able to perform new activities to increase lumbar ROM, and was able to complete them without any difficulty or increase of pain. Patient will continue to benefit from skilled PT services to modify and progress therapeutic interventions, address functional mobility deficits, address ROM deficits, address strength deficits, analyze and address soft tissue restrictions, analyze and cue movement patterns, analyze and modify body mechanics/ergonomics, and assess and modify postural abnormalities to attain remaining goals. GOALS/Progress towards goals:  Short Term Goals: To be accomplished in 4 weeks:              1.  Inpd with HEP Met 11/16              2.  Decrease pain to < 5/10 without routine ADL   Long Term Goals: To be accomplished in 8 weeks:              1.  Normal gait on even surfaces. 2. Pain < 2/10 with routine ADL. 3. Full AROM entire spine. Patient Goal (s): get back to being pain free.    PLAN  [x]  Continue plan of care  []  Upgrade activities as tolerated       []  Update interventions per flow sheet       []  Discharge due to:  []  Other:     Amy Garcia., PTA 12/12/2022

## 2022-12-14 ENCOUNTER — HOSPITAL ENCOUNTER (OUTPATIENT)
Dept: PHYSICAL THERAPY | Age: 57
Discharge: HOME | End: 2022-12-14
Payer: MEDICAID

## 2022-12-14 PROCEDURE — 97014 ELECTRIC STIMULATION THERAPY: CPT

## 2022-12-14 PROCEDURE — 97110 THERAPEUTIC EXERCISES: CPT

## 2022-12-19 ENCOUNTER — HOSPITAL ENCOUNTER (OUTPATIENT)
Dept: PHYSICAL THERAPY | Age: 57
End: 2022-12-19
Payer: MEDICAID

## 2022-12-19 PROCEDURE — 97110 THERAPEUTIC EXERCISES: CPT

## 2022-12-19 NOTE — PROGRESS NOTES
PT DAILY TREATMENT NOTE     Patient Name: Randy Alfaro  Date:2022  : 1965  [x]  Patient  Verified  Payor: [employer]ICA[employer] / Plan: [address] [place] [address] / Product Type: Managed Care Medicaid /    Treatment Area: Other low back pain [M54.59]  Cervicalgia [M54.2]  Sprain of ligaments of lumbar spine, subsequent encounter [S33. 5XXD]   Next MD APPT:   In time: 230pm   Out time: 315 pm  Total Treatment Time (min): 45  Total Timed Codes (min): 38  1:1 Treatment Time (MC only): 38  Visit #: 14    SUBJECTIVE    Any medication changes, allergies to medications, adverse drug reactions, diagnosis change, or new procedure performed?:   [x] No    [] Yes (see summary sheet for update)    Pain Level (0-10 scale) pre treatment: 1          Pain Level (0-10 scale) post treatment: 0    Subjective functional status/changes:   [] No changes reported  Pt states that she is feeling a little better.      OBJECTIVE    Modality rationale: decrease inflammation, decrease pain, increase tissue extensibility, and increase muscle contraction/control to improve the patients ability to reduce pain   Min Type Additional Details    [] Estim: []UnAtt   []Att       []TENS instruct                  []IFC  []Premod   []NMES                     []Other:  []w/US   []w/ice   []w/heat  Position: seated  Location: lumbar paraspinals, B UT    []  Ice     []  Heat  []  Ice massage Position:  Location:    []  Traction: [] Cervical       []Lumbar                       [] Prone          []Supine                       []Intermittent   []Continuous Lbs:  []w/heat  []W/heat and Estim    []  Ultrasound: []Continuous   [] Pulsed at:                           []1MHz   []3MHz Location:  W/cm2:      [] Skin assessment post-treatment:   []intact  []redness- no adverse reaction   []redness - adverse reaction:     38 min Therapeutic Exercise:  [x] See flow sheet :   Rationale: increase ROM, increase strength, and improve coordination to improve the patients ability to move without pain   min Manual Therapy:     Rationale: decrease pain, increase ROM, increase tissue extensibility, and decrease trigger points to improve the patients ability to being pain free     min Neuromuscular Re-education:  []  See flow sheet :   Rationale: increase ROM, increase strength, and improve coordination  to improve the patients ability to being pain free      With   [] TE   [] TA   [] Neuro   [] SC   [] other: Patient Education: [] Review HEP    [] Progressed/Changed HEP based on:   [] positioning   [] body mechanics   [] transfers   [] Use of heat/ice    [] other:          Other Objective/Functional Measures:        ASSESSMENT/Changes in Function   Pt tolerated session without any increase of symptoms. Pt able to increase speed and duration on treadmill without any increase of symptoms. Will speak with supervising PT regarding discharge within the next few sessions. Patient will continue to benefit from skilled PT services to modify and progress therapeutic interventions, address functional mobility deficits, address ROM deficits, address strength deficits, analyze and address soft tissue restrictions, analyze and cue movement patterns, analyze and modify body mechanics/ergonomics, and assess and modify postural abnormalities to attain remaining goals. GOALS/Progress towards goals:  Short Term Goals: To be accomplished in 4 weeks:              1.  Inpd with HEP Met 11/16              2.  Decrease pain to < 5/10 without routine ADL   Long Term Goals: To be accomplished in 8 weeks:              1.  Normal gait on even surfaces. 2. Pain < 2/10 with routine ADL. 3. Full AROM entire spine. Patient Goal (s): get back to being pain free.    PLAN  [x]  Continue plan of care  []  Upgrade activities as tolerated       []  Update interventions per flow sheet       []  Discharge due to:  []  Other:     Aries Mccrary, PTA 12/19/2022

## 2022-12-21 ENCOUNTER — HOSPITAL ENCOUNTER (OUTPATIENT)
Dept: PHYSICAL THERAPY | Age: 57
End: 2022-12-21
Payer: MEDICAID

## 2022-12-21 PROCEDURE — 97110 THERAPEUTIC EXERCISES: CPT

## 2022-12-21 NOTE — PROGRESS NOTES
PT DAILY TREATMENT NOTE     Patient Name: Kimberley Chen  Date:2022  : 1965  [x]  Patient  Verified  Payor: [employer]ICA[employer] / Plan: [address] [place] [address] / Product Type: Managed Care Medicaid /    Treatment Area: Other low back pain [M54.59]  Cervicalgia [M54.2]  Sprain of ligaments of lumbar spine, subsequent encounter [S33. 5XXD]   Next MD APPT:   In time: 230pm   Out time: 315 pm  Total Treatment Time (min): 45  Total Timed Codes (min): 40  1:1 Treatment Time (MC only): 40  Visit #: 15    SUBJECTIVE    Any medication changes, allergies to medications, adverse drug reactions, diagnosis change, or new procedure performed?:   [x] No    [] Yes (see summary sheet for update)    Pain Level (0-10 scale) pre treatment: 0          Pain Level (0-10 scale) post treatment: 0    Subjective functional status/changes:   [] No changes reported  Pt states that she is feeling a little better.      OBJECTIVE    Modality rationale: decrease inflammation, decrease pain, increase tissue extensibility, and increase muscle contraction/control to improve the patients ability to reduce pain   Min Type Additional Details    [] Estim: []UnAtt   []Att       []TENS instruct                  []IFC  []Premod   []NMES                     []Other:  []w/US   []w/ice   []w/heat  Position: seated  Location: lumbar paraspinals, B UT    []  Ice     []  Heat  []  Ice massage Position:  Location:    []  Traction: [] Cervical       []Lumbar                       [] Prone          []Supine                       []Intermittent   []Continuous Lbs:  []w/heat  []W/heat and Estim    []  Ultrasound: []Continuous   [] Pulsed at:                           []1MHz   []3MHz Location:  W/cm2:      [] Skin assessment post-treatment:   []intact  []redness- no adverse reaction   []redness - adverse reaction:     40 min Therapeutic Exercise:  [x] See flow sheet :   Rationale: increase ROM, increase strength, and improve coordination to improve the patients ability to move without pain   min Manual Therapy:     Rationale: decrease pain, increase ROM, increase tissue extensibility, and decrease trigger points to improve the patients ability to being pain free     min Neuromuscular Re-education:  []  See flow sheet :   Rationale: increase ROM, increase strength, and improve coordination  to improve the patients ability to being pain free      With   [] TE   [] TA   [] Neuro   [] SC   [] other: Patient Education: [] Review HEP    [] Progressed/Changed HEP based on:   [] positioning   [] body mechanics   [] transfers   [] Use of heat/ice    [] other:          Other Objective/Functional Measures:        ASSESSMENT/Changes in Function   Pt tolerated session without any increase of symptoms. Pt able to increase speed and duration on treadmill without any increase of symptoms. Will speak with supervising PT regarding discharge within the next few sessions. Patient will continue to benefit from skilled PT services to modify and progress therapeutic interventions, address functional mobility deficits, address ROM deficits, address strength deficits, analyze and address soft tissue restrictions, analyze and cue movement patterns, analyze and modify body mechanics/ergonomics, and assess and modify postural abnormalities to attain remaining goals. GOALS/Progress towards goals:  Short Term Goals: To be accomplished in 4 weeks:              1.  Inpd with HEP Met 11/16              2.  Decrease pain to < 5/10 without routine ADL   Long Term Goals: To be accomplished in 8 weeks:              1.  Normal gait on even surfaces. 2. Pain < 2/10 with routine ADL. 3. Full AROM entire spine. Patient Goal (s): get back to being pain free.    PLAN  [x]  Continue plan of care  []  Upgrade activities as tolerated       []  Update interventions per flow sheet       []  Discharge due to:  []  Other:     Jeff Darden., PTA 12/21/2022

## 2022-12-27 ENCOUNTER — APPOINTMENT (OUTPATIENT)
Dept: PHYSICAL THERAPY | Age: 57
End: 2022-12-27
Payer: MEDICAID

## 2022-12-27 PROCEDURE — 97110 THERAPEUTIC EXERCISES: CPT

## 2022-12-27 NOTE — PROGRESS NOTES
PT DAILY TREATMENT NOTE     Patient Name: Marcus Choe  Date:2022  : 1965  [x]  Patient  Verified  Payor: [employer] / Plan: [address] [place] [address] / Product Type: Managed Care Medicaid /    Treatment Area: Other low back pain [M54.59]  Cervicalgia [M54.2]  Sprain of ligaments of lumbar spine, subsequent encounter [S33. 5XXD]   Next MD APPT:   In time: 230pm   Out time: 315 pm  Total Treatment Time (min): 45  Total Timed Codes (min): 40  1:1 Treatment Time (MC only): 40  Visit #: 16    SUBJECTIVE    Any medication changes, allergies to medications, adverse drug reactions, diagnosis change, or new procedure performed?:   [x] No    [] Yes (see summary sheet for update)    Pain Level (0-10 scale) pre treatment: 0          Pain Level (0-10 scale) post treatment: 0    Subjective functional status/changes:   [] No changes reported  Pt states that she is feeling a little better.      OBJECTIVE    Modality rationale: decrease inflammation, decrease pain, increase tissue extensibility, and increase muscle contraction/control to improve the patients ability to reduce pain   Min Type Additional Details    [] Estim: []UnAtt   []Att       []TENS instruct                  []IFC  []Premod   []NMES                     []Other:  []w/US   []w/ice   []w/heat  Position: seated  Location: lumbar paraspinals, B UT    []  Ice     []  Heat  []  Ice massage Position:  Location:    []  Traction: [] Cervical       []Lumbar                       [] Prone          []Supine                       []Intermittent   []Continuous Lbs:  []w/heat  []W/heat and Estim    []  Ultrasound: []Continuous   [] Pulsed at:                           []1MHz   []3MHz Location:  W/cm2:      [] Skin assessment post-treatment:   []intact  []redness- no adverse reaction   []redness - adverse reaction:     40 min Therapeutic Exercise:  [x] See flow sheet :   Rationale: increase ROM, increase strength, and improve coordination to improve the patients ability to move without pain   min Manual Therapy:     Rationale: decrease pain, increase ROM, increase tissue extensibility, and decrease trigger points to improve the patients ability to being pain free     min Neuromuscular Re-education:  []  See flow sheet :   Rationale: increase ROM, increase strength, and improve coordination  to improve the patients ability to being pain free      With   [] TE   [] TA   [] Neuro   [] SC   [] other: Patient Education: [] Review HEP    [] Progressed/Changed HEP based on:   [] positioning   [] body mechanics   [] transfers   [] Use of heat/ice    [] other:          Other Objective/Functional Measures:    [facility]: 16.05% (improvement)      ASSESSMENT/Changes in Function   Pt has completed all sessions of PT and has met all of her goals. Pt will be returning back to work beginning next week and has no difficulty completing her ADLs. Will speak with supervising PT regarding discharge. Patient will continue to benefit from skilled PT services to modify and progress therapeutic interventions, address functional mobility deficits, address ROM deficits, address strength deficits, analyze and address soft tissue restrictions, analyze and cue movement patterns, analyze and modify body mechanics/ergonomics, and assess and modify postural abnormalities to attain remaining goals. GOALS/Progress towards goals:  Short Term Goals: To be accomplished in 4 weeks:              1.  Inpd with HEP Met 11/16              2.  Decrease pain to < 5/10 without routine ADL Met 12/27  Long Term Goals: To be accomplished in 8 weeks:              1.  Normal gait on even surfaces. Met 12/27              2. Pain < 2/10 with routine ADL. Met 12/27              3. Full AROM entire spine. Met 12/27  Patient Goal (s): get back to being pain free.    PLAN  [x]  Continue plan of care  []  Upgrade activities as tolerated       []  Update interventions per flow sheet       [] Discharge due to:  []  Other:     Marion Lester, PTA 12/27/2022

## 2022-12-28 NOTE — PROGRESS NOTES
[address], [address] [facility], [place], operated by [facility]  Phone: [phone]   Fax: [phone]    Discharge Summary  2-15    Patient name: Abdullahi Turner  : 1965  Provider#: [id number]  Referral source: Jimmy Pope MD      Medical/Treatment Diagnosis: Other low back pain [M54.59]  Cervicalgia [M54.2]  Sprain of ligaments of lumbar spine, subsequent encounter [S33. 5XXD]     Prior Hospitalization: see medical history     Comorbidities: See Plan of Care  Prior Level of Function:See Plan of Care  Medications: Verified on Patient Summary List    Start of Care: 10/18/2022      Onset Date:2022   Visits from Start of Care: 15     Missed Visits: 0  Reporting Period : 10/18/2022 to 2023      ASSESSMENT/SUMMARY OF CARE:   Core/LE strengthening, flexibility/ROM TL spine. [facility] Functional Measure: 16%/100  Discharge  84%/100  Intake  GOALS/Progress towards goals:  Short Term Goals: To be accomplished in 4 weeks:              1.  Inpd with HEP Met               2.  Decrease pain to < 5/10 without routine ADL Met   Long Term Goals: To be accomplished in 8 weeks:              1.  Normal gait on even surfaces. Met               2. Pain < 2/10 with routine ADL. Met               3. Full AROM entire spine. Met   Patient Goal (s): get back to being pain free.    PLAN    RECOMMENDATIONS:  [x]Discontinue therapy: [x]Patient has reached or is progressing toward set goals      []Patient is non-compliant or has abdicated      []Due to lack of appreciable progress towards set goals      []Other    April Gentry-Candace, PT 2022

## 2024-10-09 ENCOUNTER — HOSPITAL ENCOUNTER (EMERGENCY)
Facility: HOSPITAL | Age: 59
Discharge: HOME | End: 2024-10-10
Attending: EMERGENCY MEDICINE
Payer: MEDICAID

## 2024-10-09 DIAGNOSIS — I10 HYPERTENSION, UNSPECIFIED TYPE: Primary | ICD-10-CM

## 2024-10-09 DIAGNOSIS — R51.9 ACUTE INTRACTABLE HEADACHE, UNSPECIFIED HEADACHE TYPE: ICD-10-CM

## 2024-10-09 LAB
ANION GAP SERPL CALC-SCNC: 8 MMOL/L (ref 2–12)
BASOPHILS # BLD: 0 K/UL (ref 0–0.1)
BASOPHILS NFR BLD: 0 % (ref 0–1)
BUN SERPL-MCNC: 13 MG/DL (ref 6–20)
BUN/CREAT SERPL: 10 (ref 12–20)
CA-I BLD-MCNC: 9.9 MG/DL (ref 8.5–10.1)
CHLORIDE SERPL-SCNC: 110 MMOL/L (ref 97–108)
CO2 SERPL-SCNC: 24 MMOL/L (ref 21–32)
CREAT SERPL-MCNC: 1.34 MG/DL (ref 0.55–1.02)
DIFFERENTIAL METHOD BLD: ABNORMAL
EOSINOPHIL # BLD: 0 K/UL (ref 0–0.4)
EOSINOPHIL NFR BLD: 0 % (ref 0–7)
ERYTHROCYTE [DISTWIDTH] IN BLOOD BY AUTOMATED COUNT: 13.1 % (ref 11.5–14.5)
GLUCOSE SERPL-MCNC: 150 MG/DL (ref 65–100)
HCT VFR BLD AUTO: 39.9 % (ref 35–47)
HGB BLD-MCNC: 13 G/DL (ref 11.5–16)
IMM GRANULOCYTES # BLD AUTO: 0.1 K/UL (ref 0–0.04)
IMM GRANULOCYTES NFR BLD AUTO: 1 % (ref 0–0.5)
LYMPHOCYTES # BLD: 1.3 K/UL (ref 0.8–3.5)
LYMPHOCYTES NFR BLD: 16 % (ref 12–49)
MAGNESIUM SERPL-MCNC: 1.9 MG/DL (ref 1.6–2.4)
MCH RBC QN AUTO: 28 PG (ref 26–34)
MCHC RBC AUTO-ENTMCNC: 32.6 G/DL (ref 30–36.5)
MCV RBC AUTO: 86 FL (ref 80–99)
MONOCYTES # BLD: 0.3 K/UL (ref 0–1)
MONOCYTES NFR BLD: 3 % (ref 5–13)
NEUTS SEG # BLD: 6.7 K/UL (ref 1.8–8)
NEUTS SEG NFR BLD: 80 % (ref 32–75)
NRBC # BLD: 0 K/UL (ref 0–0.01)
NRBC BLD-RTO: 0 PER 100 WBC
PLATELET # BLD AUTO: 405 K/UL (ref 150–400)
PMV BLD AUTO: 10 FL (ref 8.9–12.9)
POTASSIUM SERPL-SCNC: 4.2 MMOL/L (ref 3.5–5.1)
RBC # BLD AUTO: 4.64 M/UL (ref 3.8–5.2)
SODIUM SERPL-SCNC: 142 MMOL/L (ref 136–145)
TROPONIN I SERPL HS-MCNC: 33 NG/L (ref 0–51)
WBC # BLD AUTO: 8.3 K/UL (ref 3.6–11)

## 2024-10-09 PROCEDURE — 36415 COLL VENOUS BLD VENIPUNCTURE: CPT

## 2024-10-09 PROCEDURE — 96374 THER/PROPH/DIAG INJ IV PUSH: CPT

## 2024-10-09 PROCEDURE — 85025 COMPLETE CBC W/AUTO DIFF WBC: CPT

## 2024-10-09 PROCEDURE — 6370000000 HC RX 637 (ALT 250 FOR IP): Performed by: EMERGENCY MEDICINE

## 2024-10-09 PROCEDURE — 96375 TX/PRO/DX INJ NEW DRUG ADDON: CPT

## 2024-10-09 PROCEDURE — 99284 EMERGENCY DEPT VISIT MOD MDM: CPT

## 2024-10-09 PROCEDURE — 83735 ASSAY OF MAGNESIUM: CPT

## 2024-10-09 PROCEDURE — 80048 BASIC METABOLIC PNL TOTAL CA: CPT

## 2024-10-09 PROCEDURE — 6360000002 HC RX W HCPCS: Performed by: EMERGENCY MEDICINE

## 2024-10-09 PROCEDURE — 93005 ELECTROCARDIOGRAM TRACING: CPT | Performed by: EMERGENCY MEDICINE

## 2024-10-09 PROCEDURE — 82077 ASSAY SPEC XCP UR&BREATH IA: CPT

## 2024-10-09 PROCEDURE — 84484 ASSAY OF TROPONIN QUANT: CPT

## 2024-10-09 RX ORDER — KETOROLAC TROMETHAMINE 15 MG/ML
15 INJECTION, SOLUTION INTRAMUSCULAR; INTRAVENOUS
Status: DISCONTINUED | OUTPATIENT
Start: 2024-10-09 | End: 2024-10-10 | Stop reason: HOSPADM

## 2024-10-09 RX ORDER — HYDRALAZINE HYDROCHLORIDE 20 MG/ML
10 INJECTION INTRAMUSCULAR; INTRAVENOUS
Status: COMPLETED | OUTPATIENT
Start: 2024-10-09 | End: 2024-10-09

## 2024-10-09 RX ORDER — ACETAMINOPHEN 325 MG/1
650 TABLET ORAL
Status: COMPLETED | OUTPATIENT
Start: 2024-10-09 | End: 2024-10-09

## 2024-10-09 RX ORDER — DIPHENHYDRAMINE HYDROCHLORIDE 50 MG/ML
25 INJECTION INTRAMUSCULAR; INTRAVENOUS
Status: COMPLETED | OUTPATIENT
Start: 2024-10-09 | End: 2024-10-09

## 2024-10-09 RX ORDER — METOCLOPRAMIDE HYDROCHLORIDE 5 MG/ML
10 INJECTION INTRAMUSCULAR; INTRAVENOUS ONCE
Status: COMPLETED | OUTPATIENT
Start: 2024-10-09 | End: 2024-10-10

## 2024-10-09 RX ADMIN — HYDRALAZINE HYDROCHLORIDE 10 MG: 20 INJECTION, SOLUTION INTRAMUSCULAR; INTRAVENOUS at 22:03

## 2024-10-09 RX ADMIN — ACETAMINOPHEN 650 MG: 325 TABLET ORAL at 23:31

## 2024-10-09 RX ADMIN — DIPHENHYDRAMINE HYDROCHLORIDE 25 MG: 50 INJECTION INTRAMUSCULAR; INTRAVENOUS at 21:41

## 2024-10-09 ASSESSMENT — PAIN SCALES - GENERAL
PAINLEVEL_OUTOF10: 9
PAINLEVEL_OUTOF10: 8
PAINLEVEL_OUTOF10: 7

## 2024-10-09 ASSESSMENT — PAIN DESCRIPTION - LOCATION: LOCATION: EYE

## 2024-10-09 ASSESSMENT — LIFESTYLE VARIABLES
HOW MANY STANDARD DRINKS CONTAINING ALCOHOL DO YOU HAVE ON A TYPICAL DAY: PATIENT DOES NOT DRINK
HOW OFTEN DO YOU HAVE A DRINK CONTAINING ALCOHOL: NEVER

## 2024-10-09 ASSESSMENT — PAIN - FUNCTIONAL ASSESSMENT: PAIN_FUNCTIONAL_ASSESSMENT: 0-10

## 2024-10-10 ENCOUNTER — APPOINTMENT (OUTPATIENT)
Facility: HOSPITAL | Age: 59
End: 2024-10-10
Payer: MEDICAID

## 2024-10-10 VITALS
HEART RATE: 86 BPM | BODY MASS INDEX: 36.7 KG/M2 | RESPIRATION RATE: 18 BRPM | TEMPERATURE: 97.8 F | HEIGHT: 64 IN | WEIGHT: 215 LBS | SYSTOLIC BLOOD PRESSURE: 148 MMHG | OXYGEN SATURATION: 100 % | DIASTOLIC BLOOD PRESSURE: 121 MMHG

## 2024-10-10 LAB
EKG ATRIAL RATE: 65 BPM
EKG DIAGNOSIS: NORMAL
EKG P AXIS: 46 DEGREES
EKG P-R INTERVAL: 150 MS
EKG Q-T INTERVAL: 400 MS
EKG QRS DURATION: 96 MS
EKG QTC CALCULATION (BAZETT): 416 MS
EKG R AXIS: -6 DEGREES
EKG T AXIS: 18 DEGREES
EKG VENTRICULAR RATE: 65 BPM
ETHANOL SERPL-MCNC: <10 MG/DL (ref 0–0.08)

## 2024-10-10 PROCEDURE — 6360000002 HC RX W HCPCS: Performed by: EMERGENCY MEDICINE

## 2024-10-10 PROCEDURE — 70450 CT HEAD/BRAIN W/O DYE: CPT

## 2024-10-10 PROCEDURE — 6370000000 HC RX 637 (ALT 250 FOR IP): Performed by: EMERGENCY MEDICINE

## 2024-10-10 PROCEDURE — 96375 TX/PRO/DX INJ NEW DRUG ADDON: CPT

## 2024-10-10 RX ORDER — HYDROCHLOROTHIAZIDE 25 MG/1
12.5 TABLET ORAL ONCE
Status: COMPLETED | OUTPATIENT
Start: 2024-10-10 | End: 2024-10-10

## 2024-10-10 RX ORDER — LISINOPRIL AND HYDROCHLOROTHIAZIDE 12.5; 2 MG/1; MG/1
1 TABLET ORAL
Status: DISCONTINUED | OUTPATIENT
Start: 2024-10-10 | End: 2024-10-10

## 2024-10-10 RX ORDER — LISINOPRIL 10 MG/1
20 TABLET ORAL ONCE
Status: COMPLETED | OUTPATIENT
Start: 2024-10-10 | End: 2024-10-10

## 2024-10-10 RX ORDER — LORAZEPAM 2 MG/ML
1 INJECTION INTRAMUSCULAR ONCE
Status: COMPLETED | OUTPATIENT
Start: 2024-10-10 | End: 2024-10-10

## 2024-10-10 RX ORDER — LISINOPRIL AND HYDROCHLOROTHIAZIDE 12.5; 2 MG/1; MG/1
1 TABLET ORAL DAILY
Qty: 60 TABLET | Refills: 0 | Status: SHIPPED | OUTPATIENT
Start: 2024-10-10

## 2024-10-10 RX ADMIN — LISINOPRIL 20 MG: 10 TABLET ORAL at 01:03

## 2024-10-10 RX ADMIN — LORAZEPAM 1 MG: 2 INJECTION INTRAMUSCULAR; INTRAVENOUS at 00:30

## 2024-10-10 RX ADMIN — HYDROCHLOROTHIAZIDE 12.5 MG: 25 TABLET ORAL at 01:03

## 2024-10-10 RX ADMIN — METOCLOPRAMIDE HYDROCHLORIDE 10 MG: 5 INJECTION, SOLUTION INTRAMUSCULAR; INTRAVENOUS at 01:16

## 2024-10-10 NOTE — ED PROVIDER NOTES
patient.  Will follow-up behavioral health eval and recommendation. [PZ]   0205 Patient started becoming very agitated screaming, states that she is still very anxious is having a headache.  Head CT does not show any acute intracranial injury.  I reassessed patient, is extremely anxious, stating \"I need to walk around\" no chest pain shortness of breath.  Patient crying, inconsolable.  Vital signs remain stable moderately hypertensive.  Will administer Toradol 30 mg IV. [PZ]   0218 Now patient refusing to take Toradol, states she just wants to go home.  Is requesting refills on her blood pressure medicines.  Will discharge her with refills, instructed to follow-up with primary care physician as needed, return to emergency department for any worsening weakness dizziness, headache. [PZ]      ED Course User Index  [CS] Melvin English MD  [PZ] Giovanny Klein MD       SEPSIS Reassessment: Sepsis reassessment not applicable    Clinical Management Tools:  Not Applicable    Patient was given the following medications:  Medications   ketorolac (TORADOL) injection 15 mg (15 mg IntraVENous Not Given 10/9/24 2150)   metoclopramide (REGLAN) injection 10 mg (10 mg IntraVENous Not Given 10/9/24 2150)   diphenhydrAMINE (BENADRYL) injection 25 mg (25 mg IntraVENous Given 10/9/24 2141)   hydrALAZINE (APRESOLINE) injection 10 mg (10 mg IntraVENous Given 10/9/24 2203)   acetaminophen (TYLENOL) tablet 650 mg (650 mg Oral Given 10/9/24 2331)       CONSULTS: See ED Course/MDM for further details.  None     Social Determinants affecting Diagnosis/Treatment: None    Smoking Cessation: Not Applicable    PROCEDURES   Unless otherwise noted above, none  Procedures      CRITICAL CARE TIME   Patient does not meet Critical Care Time, 0 minutes    ED IMPRESSION     1. Hypertension, unspecified type    2. Acute intractable headache, unspecified headache type          DISPOSITION/PLAN   DISPOSITION    Condition at Disposition: Data

## 2024-10-10 NOTE — ED NOTES
Patient continuing to cry inconsolably. Requesting to get out of bed and walk around ED. This nurse explained to patient why that is a safety concern. Patient repeating, \"I just need to walk around\", \"I got gas now from that medicine you all gave me earlier\". Patient educated once again. Patient now to CT at this time via stretcher. Primary RN made aware.    (4) walks frequently

## 2024-10-10 NOTE — ED NOTES
Patient crying inconsolably in ER 3. States all she needs is \"Lisinopril and IV fluids to flush out previous medications administered\" while raising voice at this RN. Noticeably anxious. Rocking back and forth in ER stretcher. Stated she would like to walk around emergency room. This RN educated patient regarding safety and that patient is not able to walk around emergency room. MD notified and placing orders.

## 2024-10-10 NOTE — ED NOTES
Patient actively vomiting at this time. Requesting to be wheeled to the bathroom. Patient now agreeable to take Reglan.

## 2024-10-10 NOTE — ED TRIAGE NOTES
Patient reports HTN and headache starting at 1800 tonight. Was seen earlier at PCP for steroid shot and states her BP was elevated during triage.     Reports being prescribed antihypertensives but ran out \"a couple months ago\". Denies gait change, slurred speech, weakness/numbness, or vision changes. No c/o chest pain or shortness of breath.

## 2024-10-21 ENCOUNTER — HOSPITAL ENCOUNTER (OUTPATIENT)
Facility: HOSPITAL | Age: 59
Setting detail: RECURRING SERIES
Discharge: HOME | End: 2024-10-24
Payer: MEDICAID

## 2024-10-21 PROCEDURE — 97161 PT EVAL LOW COMPLEX 20 MIN: CPT
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
## 2024-10-23 ENCOUNTER — HOSPITAL ENCOUNTER (OUTPATIENT)
Facility: HOSPITAL | Age: 59
Setting detail: RECURRING SERIES
Discharge: HOME | End: 2024-10-26
Payer: MEDICAID

## 2024-10-23 PROCEDURE — 97110 THERAPEUTIC EXERCISES: CPT | Performed by: PHYSICAL THERAPIST

## 2024-10-23 NOTE — PROGRESS NOTES
PHYSICAL THERAPY - MEDICARE DAILY TREATMENT NOTE (updated 3/23)      Date: 10/23/2024          Patient Name:  Nuvia Perez :  1965   Medical   Diagnosis:  No admission diagnoses are documented for this encounter. Treatment Diagnosis:  M25.551  RIGHT HIP PAIN  and M54.59  OTHER LOWER BACK PAIN    Referral Source:  Cat Valle MD Insurance:   Payor: [facility] MEDICAID / Plan: [facility] HEALTHKEEPERS PLUS / Product Type: *No Product type* /                     Patient  verified yes     Visit #   Current  / Total 2 8 visits   Time   In / Out 1000 am  1045 am    Total Treatment Time 45 minutes   Total Timed Codes 40   1:1 Treatment Time 40      [facility] Totals Reminder:  bill using total billable   min of TIMED therapeutic procedures and modalities.   8-22 min = 1 unit; 23-37 min = 2 units; 38-52 min = 3 units; 53-67 min = 4 units; 68-82 min = 5 units            SUBJECTIVE    Pain Level (0-10 scale): 8, low back and right groin     Any medication changes, allergies to medications, adverse drug reactions, diagnosis change, or new procedure performed?: [x] No    [] Yes (see summary sheet for update)  Medications: Verified on Patient Summary List    Subjective functional status/changes:     Patient reports chronic low back and right groin.       OBJECTIVE      Therapeutic Procedures:  Tx Min Billable or 1:1 Min (if diff from Tx Min) Procedure, Rationale, Specifics   40   12007 Therapeutic Exercise (timed):  increase ROM, strength, coordination, balance, and proprioception to improve patient's ability to progress to PLOF and address remaining functional goals. (see flow sheet as applicable)     Details if applicable:            Details if applicable:           Details if applicable:           Details if applicable:            Details if applicable:     40     Total Total         [x]  Patient Education billed concurrently with other procedures   [x] Review HEP    [] Progressed/Changed HEP, detail:

## 2024-10-29 ENCOUNTER — HOSPITAL ENCOUNTER (OUTPATIENT)
Facility: HOSPITAL | Age: 59
Setting detail: RECURRING SERIES
Discharge: HOME | End: 2024-11-01
Payer: MEDICAID

## 2024-10-29 PROCEDURE — 97140 MANUAL THERAPY 1/> REGIONS: CPT | Performed by: PHYSICAL THERAPIST

## 2024-10-29 PROCEDURE — 97110 THERAPEUTIC EXERCISES: CPT | Performed by: PHYSICAL THERAPIST

## 2024-10-29 NOTE — PROGRESS NOTES
PHYSICAL THERAPY - MEDICARE DAILY TREATMENT NOTE (updated 3/23)      Date: 10/29/2024          Patient Name:  Nuvia Perez :  1965   Medical   Diagnosis:  No admission diagnoses are documented for this encounter. Treatment Diagnosis:  M25.551  RIGHT HIP PAIN  and M54.59  OTHER LOWER BACK PAIN    Referral Source:  Cat Valle MD Insurance:   Payor: [facility] MEDICAID / Plan: [facility] HEALTHKEEPERS PLUS / Product Type: *No Product type* /                     Patient  verified yes     Visit #   Current  / Total 3 8 visits   Time   In / Out 800 am  850 am    Total Treatment Time 50 minutes   Total Timed Codes 40   1:1 Treatment Time 40      [facility] Totals Reminder:  bill using total billable   min of TIMED therapeutic procedures and modalities.   8-22 min = 1 unit; 23-37 min = 2 units; 38-52 min = 3 units; 53-67 min = 4 units; 68-82 min = 5 units            SUBJECTIVE    Pain Level (0-10 scale): 8, low back and right groin     Any medication changes, allergies to medications, adverse drug reactions, diagnosis change, or new procedure performed?: [x] No    [] Yes (see summary sheet for update)  Medications: Verified on Patient Summary List    Subjective functional status/changes:       Patient reports chronic low back and right groin.   No changes reported.     OBJECTIVE      Therapeutic Procedures:  Tx Min Billable or 1:1 Min (if diff from Tx Min) Procedure, Rationale, Specifics   30   48876 Therapeutic Exercise (timed):  increase ROM, strength, coordination, balance, and proprioception to improve patient's ability to progress to PLOF and address remaining functional goals. (see flow sheet as applicable)     Details if applicable:     10   84789 Manual Therapy (timed):  decrease pain, increase ROM, increase tissue extensibility, and decrease trigger points to improve patient's ability to progress to PLOF and address remaining functional goals.  The manual therapy interventions were performed

## 2024-10-31 ENCOUNTER — HOSPITAL ENCOUNTER (OUTPATIENT)
Facility: HOSPITAL | Age: 59
Setting detail: RECURRING SERIES
Discharge: HOME | End: 2024-11-03
Payer: MEDICAID

## 2024-10-31 PROCEDURE — 97110 THERAPEUTIC EXERCISES: CPT | Performed by: PHYSICAL THERAPIST

## 2024-10-31 NOTE — PROGRESS NOTES
PHYSICAL THERAPY - MEDICARE DAILY TREATMENT NOTE (updated 3/23)      Date: 10/31/2024          Patient Name:  Nuvia Perez :  1965   Medical   Diagnosis:  No admission diagnoses are documented for this encounter. Treatment Diagnosis:  M25.551  RIGHT HIP PAIN  and M54.59  OTHER LOWER BACK PAIN    Referral Source:  Cat Valle MD Insurance:   Payor: [facility] MEDICAID / Plan: [facility] HEALTHKEEPERS PLUS / Product Type: *No Product type* /                     Patient  verified yes     Visit #   Current  / Total 4 8 visits   Time   In / Out 803 am  850 am    Total Treatment Time 50 minutes   Total Timed Codes 40   1:1 Treatment Time 40      [facility] Totals Reminder:  bill using total billable   min of TIMED therapeutic procedures and modalities.   8-22 min = 1 unit; 23-37 min = 2 units; 38-52 min = 3 units; 53-67 min = 4 units; 68-82 min = 5 units            SUBJECTIVE    Pain Level (0-10 scale): 8.5, low back and right groin     Any medication changes, allergies to medications, adverse drug reactions, diagnosis change, or new procedure performed?: [x] No    [] Yes (see summary sheet for update)  Medications: Verified on Patient Summary List    Subjective functional status/changes:       Patient reports an increase in pain after the last session.   \" I thought PT was suppose to make the pain decrease\"       OBJECTIVE      Therapeutic Procedures:  Tx Min Billable or 1:1 Min (if diff from Tx Min) Procedure, Rationale, Specifics   30   89331 Therapeutic Exercise (timed):  increase ROM, strength, coordination, balance, and proprioception to improve patient's ability to progress to PLOF and address remaining functional goals. (see flow sheet as applicable)     Details if applicable:     10   66770 Manual Therapy (timed):  decrease pain, increase ROM, increase tissue extensibility, and decrease trigger points to improve patient's ability to progress to PLOF and address remaining functional goals.

## 2024-11-05 ENCOUNTER — APPOINTMENT (OUTPATIENT)
Facility: HOSPITAL | Age: 59
End: 2024-11-05
Payer: MEDICAID

## 2024-11-07 ENCOUNTER — HOSPITAL ENCOUNTER (OUTPATIENT)
Facility: HOSPITAL | Age: 59
Setting detail: RECURRING SERIES
Discharge: HOME | End: 2024-11-10
Payer: MEDICAID

## 2024-11-07 PROCEDURE — 97110 THERAPEUTIC EXERCISES: CPT

## 2024-11-07 NOTE — PROGRESS NOTES
interventions . (see flow sheet as applicable)    Details if applicable:        Details if applicable:           Details if applicable:           Details if applicable:            Details if applicable:     38     Total Total       [x]  Patient Education billed concurrently with other procedures   [x] Review HEP    [] Progressed/Changed HEP, detail:    [] Other detail:         Other Objective/Functional Measures  Added NuStep for improving hip ROM and endurance.      Pain Level at end of session (0-10 scale): 8.5      Assessment   Pt tolerated therapy session fairly well. Pt provided verbal cueing for proper form and TA initiation with supine ball press. Pt educated on impact of degenerative changes on pain levels. Continue as tolerated. Patient will continue to benefit from skilled PT / OT services to modify and progress therapeutic interventions, analyze and address functional mobility deficits, analyze and address ROM deficits, and analyze and cue for proper movement patterns to address functional deficits and attain remaining goals.    Progress toward goals / Updated goals:  []  See Progress Note/Recertification     Short Term Goals: To be accomplished in 7 treatments.  HIP PROM ER IMPROVED FROM ZERO DEGREES TO 30 DEGREES.     Long Term Goals: To be accomplished in 8 treatments.  CROSS RIGHT ANKLE OVER LEFT KNEE WFL  WALK WNL W/O CANE  AVOID THR          PLAN  Yes  Continue plan of care  Re-Cert Due: 12/21/2024  [x]  Upgrade activities as tolerated  []  Discharge due to:  []  Other:      Jacquie Muniz PT       11/7/2024       3:28 PM

## 2024-11-11 ENCOUNTER — HOSPITAL ENCOUNTER (OUTPATIENT)
Facility: HOSPITAL | Age: 59
Setting detail: RECURRING SERIES
Discharge: HOME | End: 2024-11-14
Payer: MEDICAID

## 2024-11-11 PROCEDURE — 97110 THERAPEUTIC EXERCISES: CPT

## 2024-11-11 NOTE — PROGRESS NOTES
PHYSICAL THERAPY - MEDICARE DAILY TREATMENT NOTE (updated 3/23)      Date: 2024          Patient Name:  Nuvia Perez :  1965   Medical   Diagnosis:  No admission diagnoses are documented for this encounter. Treatment Diagnosis:  M25.551  RIGHT HIP PAIN  and M54.59  OTHER LOWER BACK PAIN    Referral Source:  Cat Valle MD Insurance:   Payor: [facility] MEDICAID / Plan: [facility] HEALTHKEEPERS PLUS / Product Type: *No Product type* /                     Patient  verified yes     Visit #   Current  / Total 6 8 visits   Time   In / Out 3:30 pm  4:20 pm   Total Treatment Time 50   Total Timed Codes 38   1:1 Treatment Time 38      [facility] Totals Reminder:  bill using total billable   min of TIMED therapeutic procedures and modalities.   8-22 min = 1 unit; 23-37 min = 2 units; 38-52 min = 3 units; 53-67 min = 4 units; 68-82 min = 5 units            SUBJECTIVE    Pain Level (0-10 scale): 8.5     Any medication changes, allergies to medications, adverse drug reactions, diagnosis change, or new procedure performed?: [x] No    [] Yes (see summary sheet for update)  Medications: Verified on Patient Summary List    Subjective functional status/changes:     No new complaints. Pt still has the most pain with walking.    OBJECTIVE      Therapeutic Procedures:  Tx Min Billable or 1:1 Min (if diff from Tx Min) Procedure, Rationale, Specifics   38   06254 Therapeutic Exercise (timed):  increase ROM, strength, coordination, balance, and proprioception to improve patient's ability to progress to PLOF and address remaining functional goals. (see flow sheet as applicable)     Details if applicable:        66754 Manual Therapy (timed):  decrease pain, increase ROM, increase tissue extensibility, and decrease trigger points to improve patient's ability to progress to PLOF and address remaining functional goals.  The manual therapy interventions were performed at a separate and distinct time from the

## 2024-11-14 ENCOUNTER — HOSPITAL ENCOUNTER (OUTPATIENT)
Facility: HOSPITAL | Age: 59
Setting detail: RECURRING SERIES
Discharge: HOME | End: 2024-11-17
Payer: MEDICAID

## 2024-11-14 PROCEDURE — 97110 THERAPEUTIC EXERCISES: CPT

## 2024-11-14 NOTE — PROGRESS NOTES
PHYSICAL THERAPY - MEDICARE DAILY TREATMENT NOTE (updated 3/23)      Date: 2024          Patient Name:  Nuvia Perez :  1965   Medical   Diagnosis:  No admission diagnoses are documented for this encounter. Treatment Diagnosis:  M25.551  RIGHT HIP PAIN  and M54.59  OTHER LOWER BACK PAIN    Referral Source:  Cat Valle MD Insurance:   Payor: [facility] MEDICAID / Plan: [facility] HEALTHKEEPERS PLUS / Product Type: *No Product type* /                     Patient  verified yes     Visit #   Current  / Total 7 8 visits   Time   In / Out 3:30 pm  4:00 pm   Total Treatment Time 30   Total Timed Codes 25   1:1 Treatment Time 25      [facility] Totals Reminder:  bill using total billable   min of TIMED therapeutic procedures and modalities.   8-22 min = 1 unit; 23-37 min = 2 units; 38-52 min = 3 units; 53-67 min = 4 units; 68-82 min = 5 units            SUBJECTIVE    Pain Level (0-10 scale): 8.5     Any medication changes, allergies to medications, adverse drug reactions, diagnosis change, or new procedure performed?: [x] No    [] Yes (see summary sheet for update)  Medications: Verified on Patient Summary List    Subjective functional status/changes:     Pt hasn't had much change in her pain levels.    OBJECTIVE      Therapeutic Procedures:  Tx Min Billable or 1:1 Min (if diff from Tx Min) Procedure, Rationale, Specifics   25  55174 Therapeutic Exercise (timed):  increase ROM, strength, coordination, balance, and proprioception to improve patient's ability to progress to PLOF and address remaining functional goals. (see flow sheet as applicable)     Details if applicable:        63669 Manual Therapy (timed):  decrease pain, increase ROM, increase tissue extensibility, and decrease trigger points to improve patient's ability to progress to PLOF and address remaining functional goals.  The manual therapy interventions were performed at a separate and distinct time from the therapeutic activities

## 2024-11-18 ENCOUNTER — HOSPITAL ENCOUNTER (OUTPATIENT)
Facility: HOSPITAL | Age: 59
Setting detail: RECURRING SERIES
Discharge: HOME | End: 2024-11-21
Payer: MEDICAID

## 2024-11-18 PROCEDURE — 97110 THERAPEUTIC EXERCISES: CPT

## 2024-11-18 NOTE — PROGRESS NOTES
[facility]  [address], [address]  [place] [address]  Phone: [phone]    Fax: [phone]    DISCHARGE SUMMARY  Patient Name: Nuvia Perez : 1965   Treatment/Medical Diagnosis: No admission diagnoses are documented for this encounter.   Referral Source: Cat Valle MD     Date of Initial Visit: 10/21/24 Attended Visits: 8 Missed Visits: 0     SUMMARY OF TREATMENT  Pt is a pleasant 59 y.o. female who has been receiving skilled physical therapy services over a period of 8 visits to treat R hip OA to avoid R THR. Pt demonstrates good improvements with R hip mobility, but continues to be significantly limited by heightened pain with activity including walking. Pt is appropriate for discharge at this time due to plateau in progress with pain. Pt provided updated HEP for continued management independently. Pt recommended to follow-up with MD for further medical management.     CURRENT STATUS  Short Term Goals: To be accomplished in 7 treatments.  HIP PROM ER IMPROVED FROM ZERO DEGREES TO 30 DEGREES. - Good progress 24     Long Term Goals: To be accomplished in 8 treatments.  CROSS RIGHT ANKLE OVER LEFT KNEE WFL - Good progress 24  WALK WNL W/O CANE - Not met 24  AVOID THR - Not met 24      RECOMMENDATIONS  Discontinue therapy due to lack of appreciable progress towards goals.        Jacquie Muniz, PT       2024       4:35 PM    ________________________________________________________________________________________________________________________________________________________________________    NOTE TO PHYSICIAN:  Your patient's insurance requires this discharge note be signed and returned.    ___ I have read the above report and request that my patient be discharged from therapy.     Physician's Signature:_________________________   DATE:_________   TIME:________                           Cat Valle,

## 2024-11-18 NOTE — PROGRESS NOTES
PHYSICAL THERAPY - MEDICARE DAILY TREATMENT NOTE (updated 3/23)      Date: 2024          Patient Name:  Nuvia Perez :  1965   Medical   Diagnosis:  No admission diagnoses are documented for this encounter. Treatment Diagnosis:  M25.551  RIGHT HIP PAIN  and M54.59  OTHER LOWER BACK PAIN    Referral Source:  Cat Valle MD Insurance:   Payor: [facility] MEDICAID / Plan: [facility] HEALTHKEEPERS PLUS / Product Type: *No Product type* /                     Patient  verified yes     Visit #   Current  / Total 8 8 visits   Time   In / Out 2:42 pm  3:30 pm   Total Treatment Time 48   Total Timed Codes 41   1:1 Treatment Time 41      [facility] Totals Reminder:  bill using total billable   min of TIMED therapeutic procedures and modalities.   8-22 min = 1 unit; 23-37 min = 2 units; 38-52 min = 3 units; 53-67 min = 4 units; 68-82 min = 5 units            SUBJECTIVE    Pain Level (0-10 scale): 8.5     Any medication changes, allergies to medications, adverse drug reactions, diagnosis change, or new procedure performed?: [x] No    [] Yes (see summary sheet for update)  Medications: Verified on Patient Summary List    Subjective functional status/changes:     Pt sleeps better, but still has a lot of pain during the day.    OBJECTIVE      Therapeutic Procedures:  Tx Min Billable or 1:1 Min (if diff from Tx Min) Procedure, Rationale, Specifics   41  99880 Therapeutic Exercise (timed):  increase ROM, strength, coordination, balance, and proprioception to improve patient's ability to progress to PLOF and address remaining functional goals. (see flow sheet as applicable)     Details if applicable:        99682 Manual Therapy (timed):  decrease pain, increase ROM, increase tissue extensibility, and decrease trigger points to improve patient's ability to progress to PLOF and address remaining functional goals.  The manual therapy interventions were performed at a separate and distinct time from the

## 2025-03-14 ENCOUNTER — HOSPITAL ENCOUNTER (EMERGENCY)
Facility: HOSPITAL | Age: 60
Discharge: HOME | End: 2025-03-14
Attending: EMERGENCY MEDICINE
Payer: MEDICAID

## 2025-03-14 VITALS
OXYGEN SATURATION: 97 % | TEMPERATURE: 98.1 F | DIASTOLIC BLOOD PRESSURE: 100 MMHG | WEIGHT: 212 LBS | HEIGHT: 64 IN | BODY MASS INDEX: 36.19 KG/M2 | HEART RATE: 70 BPM | RESPIRATION RATE: 18 BRPM | SYSTOLIC BLOOD PRESSURE: 174 MMHG

## 2025-03-14 DIAGNOSIS — K08.89 PAIN, DENTAL: Primary | ICD-10-CM

## 2025-03-14 PROCEDURE — 99283 EMERGENCY DEPT VISIT LOW MDM: CPT

## 2025-03-14 RX ORDER — CLINDAMYCIN HYDROCHLORIDE 300 MG/1
300 CAPSULE ORAL 3 TIMES DAILY
Qty: 21 CAPSULE | Refills: 0 | Status: SHIPPED | OUTPATIENT
Start: 2025-03-14 | End: 2025-03-15 | Stop reason: ALTCHOICE

## 2025-03-14 RX ORDER — NAPROXEN 500 MG/1
500 TABLET ORAL 2 TIMES DAILY WITH MEALS
Qty: 60 TABLET | Refills: 0 | Status: SHIPPED | OUTPATIENT
Start: 2025-03-14 | End: 2025-03-15 | Stop reason: ALTCHOICE

## 2025-03-14 ASSESSMENT — PAIN SCALES - GENERAL: PAINLEVEL_OUTOF10: 8

## 2025-03-14 ASSESSMENT — PAIN - FUNCTIONAL ASSESSMENT: PAIN_FUNCTIONAL_ASSESSMENT: 0-10

## 2025-03-14 NOTE — ED TRIAGE NOTES
Pt with c/o tooth ache and now with facial swelling.  Now ear, eye and neck is bothering her.. Unsure if infection has spread.

## 2025-03-14 NOTE — ED PROVIDER NOTES
EMERGENCY DEPARTMENT HISTORY AND PHYSICAL EXAM      Date: (Not on file)  Patient Name: Nuvia Perez    History of Presenting Illness     Chief Complaint   Patient presents with    Dental Pain       History Provided By: Patient    HPI: Nuvia Perez, 60 y.o. female presents to the ED with CC of dental pain going on for the past couple of days.  She is not treated with anything and is failed to improve.  Symptoms are mild to moderate and are worse with eating and drinking..       Patient denies SOB, chest pain, or any neurological symptoms.  There are no other complaints, changes, or physical findings at this time.     Past History     Past Medical History:  Past Medical History:   Diagnosis Date    Hypertension        Allergies:  Allergies   Allergen Reactions    Codeine Anaphylaxis    Penicillins Hives    Tetanus-Diphtheria Toxoids Td Other (See Comments)     States it almost killed her        Review of Systems   Vital signs and nursing notes reviewed    Physical Exam   There were no vitals filed for this visit.  CONSTITUTIONAL: Alert, in no distress. Appears stated age.  HEAD:  Normocephalic, atraumatic  EYES: EOM intact.  No conjunctival injection or scleral icterus  Neck:  Supple. No meningismus  RESP: Normal with no work of breathing, speaking in full sentences.  CV: Well perfused.   NEURO: Alert with normal mentation, moving extremities spontaneously  PSYCH: Normal mood, normal affect  ABDOMEN: Soft, non tender, non distended  SKIN: No appreciable rashes, no erythema  Dental: Mild diffuse dental caries  Medical Decision Making         ED COURSE and DIFFERENTIAL DIAGNOSIS/MDM   CC/HPI Summary, DDx, ED Course, and Reassessment: Will treat with antibiotics as an outpatient    Records Reviewed (source and summary of external notes): Prior medical records and Nursing notes    Vitals:  There were no vitals filed for this visit.     ED COURSE       Disposition Considerations (Tests not done, Shared

## 2025-03-15 ENCOUNTER — HOSPITAL ENCOUNTER (EMERGENCY)
Facility: HOSPITAL | Age: 60
Discharge: HOME | End: 2025-03-15
Attending: FAMILY MEDICINE
Payer: MEDICAID

## 2025-03-15 VITALS
RESPIRATION RATE: 19 BRPM | WEIGHT: 212 LBS | SYSTOLIC BLOOD PRESSURE: 150 MMHG | TEMPERATURE: 97.7 F | BODY MASS INDEX: 36.19 KG/M2 | DIASTOLIC BLOOD PRESSURE: 99 MMHG | HEIGHT: 64 IN | HEART RATE: 62 BPM | OXYGEN SATURATION: 100 %

## 2025-03-15 DIAGNOSIS — K08.89 PAIN, DENTAL: Primary | ICD-10-CM

## 2025-03-15 PROCEDURE — 99283 EMERGENCY DEPT VISIT LOW MDM: CPT

## 2025-03-15 RX ORDER — AMOXICILLIN 875 MG/1
875 TABLET, COATED ORAL 2 TIMES DAILY
Qty: 20 TABLET | Refills: 0 | Status: SHIPPED | OUTPATIENT
Start: 2025-03-15 | End: 2025-03-25

## 2025-03-15 RX ORDER — IBUPROFEN 800 MG/1
800 TABLET, FILM COATED ORAL 2 TIMES DAILY PRN
Qty: 180 TABLET | Refills: 1 | Status: SHIPPED | OUTPATIENT
Start: 2025-03-15

## 2025-03-15 ASSESSMENT — LIFESTYLE VARIABLES
HOW OFTEN DO YOU HAVE A DRINK CONTAINING ALCOHOL: NEVER
HOW MANY STANDARD DRINKS CONTAINING ALCOHOL DO YOU HAVE ON A TYPICAL DAY: PATIENT DOES NOT DRINK

## 2025-03-15 ASSESSMENT — PAIN DESCRIPTION - LOCATION: LOCATION: TEETH

## 2025-03-15 ASSESSMENT — PAIN SCALES - GENERAL: PAINLEVEL_OUTOF10: 8

## 2025-03-15 ASSESSMENT — PAIN - FUNCTIONAL ASSESSMENT: PAIN_FUNCTIONAL_ASSESSMENT: 0-10

## 2025-03-15 NOTE — ED TRIAGE NOTES
Patient seen 2 days ago for dental pain. Patient received abx, not working per patient. Right sided jaw pain, swelling.     Naproxen taken at 12pm

## 2025-03-15 NOTE — ED PROVIDER NOTES
[facility] EMERGENCY DEPT  EMERGENCY DEPARTMENT HISTORY AND PHYSICAL EXAM      Date: 3/15/2025  Patient Name: Nuvia Perez  MRN: [id number]  YOB: 1965  Date of evaluation: 3/15/2025  Provider: Vinod Horn PA-C   Note Started: 4:52 PM EDT 3/15/25    HISTORY OF PRESENT ILLNESS     Chief Complaint   Patient presents with    Dental Pain       History Provided By: Patient    HPI: Nuvia Perez is a 60 y.o. female with a past medical history presents emergency department for evaluation of persistent dental pain.  Patient reports that he was seen and evaluated 2 days prior to arrival for the same complaint states that she was started on clindamycin and naproxen but does not feel like it is working    PAST MEDICAL HISTORY   Past Medical History:  Past Medical History:   Diagnosis Date    Chronic hip pain     Hypertension        Past Surgical History:  History reviewed. No pertinent surgical history.    Family History:  History reviewed. No pertinent family history.    Social History:  Social History     Tobacco Use    Smoking status: Never    Smokeless tobacco: Never   Substance Use Topics    Alcohol use: Never    Drug use: Never       Allergies:  Allergies   Allergen Reactions    Codeine Anaphylaxis    Tetanus-Diphtheria Toxoids Td Other (See Comments)     States it almost killed her        PCP: Mehreen Valerio APRN - NP    Current Meds:   No current facility-administered medications for this encounter.     Current Outpatient Medications   Medication Sig Dispense Refill    amoxicillin (AMOXIL) 875 MG tablet Take 1 tablet by mouth 2 times daily for 10 days 20 tablet 0    ibuprofen (ADVIL;MOTRIN) 800 MG tablet Take 1 tablet by mouth 2 times daily as needed for Pain 180 tablet 1    lisinopril-hydroCHLOROthiazide (PRINZIDE;ZESTORETIC) 20-12.5 MG per tablet Take 1 tablet by mouth daily 60 tablet 0    lidocaine (LIDODERM) 5 % Apply patch to the affected area for 12 hours a day and remove for 12 hours a day.

## 2025-03-15 NOTE — DISCHARGE INSTRUCTIONS
Dentist/Dental resources:    [facility] ([facility] [place]  [address] [place] [address] [phone]    [facility]  [address], [address]  [place] [address] [phone]    [facility]  [address], [place] [id number] [phone]    [facility]   [address] [address]   Phone [phone] or [phone]   Hours [id number] (extractions)   Simple tooth extraction: $60 per tooth, $55 per x-ray     Non-Urgent Dental Care Clinics   [facility] at [address] [address]   Dental Clinic: [phone]   Oral Surgery Clinic: [phone]     Emergency Dental Care   [facility]   [address] [address]   Monday, Wednesday, Friday: 8am-5pm   Tuesday and Thursday: 8am-6pm   Phone: [phone]   $70 for Emergency Care   $60 for first routine care, then pay by sliding scale based upon income     [address] [address]   Phone: [phone], select option (2) to confirm time for treatment     [facility]   [address] [place] [address]   Monday-Friday: 8am-4pm   Phone: [phone]     [facility]   [facility], [address]   Phone: [phone] to confirm a time for emergency treatment   Pediatrics: [phone]   $75 per tooth, extractions only           Thank you for choosing our Emergency Department for your care.  It is our privilege to care for you in your time of need.  In the next several days, you may receive a survey via email or mailed to your home about your experience with our team.  We would greatly appreciate you taking a few minutes to complete the survey, as we use this information to learn what we have done well and what we could be doing better. Thank you for trusting us with your care!    Below  you will find a list of your tests from today's visit.   Labs and Radiology Studies  No results found for this or any previous visit (from the past 12 hours).  No results found.  ------------------------------------------------------------------------------------------------------------  The evaluation and treatment you received in the Emergency Department were for an urgent problem. It is important that you follow-up with a doctor, nurse practitioner, or physician assistant to:  (1) confirm your diagnosis,  (2) re-evaluation of changes in your illness and treatment, and (3) for ongoing care. Please take your discharge instructions with you when you go to your follow-up appointment.     If you have any problem arranging a follow-up appointment, contact us!  If your symptoms become worse or you do not improve as expected, please return to us. We are available 24 hours a day.     If a prescription has been provided, please fill it as soon as possible to prevent a delay in treatment. If you have any questions or reservations about taking the medication due to side effects or interactions with other medications, please call your primary care provider or contact us directly.  Again, THANK YOU for choosing us to care for YOU!

## 2025-06-02 ENCOUNTER — HOSPITAL ENCOUNTER (EMERGENCY)
Facility: HOSPITAL | Age: 60
Discharge: HOME | End: 2025-06-02
Payer: MEDICAID

## 2025-06-02 ENCOUNTER — APPOINTMENT (OUTPATIENT)
Facility: HOSPITAL | Age: 60
End: 2025-06-02
Payer: MEDICAID

## 2025-06-02 VITALS
DIASTOLIC BLOOD PRESSURE: 99 MMHG | TEMPERATURE: 97.3 F | WEIGHT: 212 LBS | BODY MASS INDEX: 36.19 KG/M2 | HEART RATE: 65 BPM | HEIGHT: 64 IN | OXYGEN SATURATION: 98 % | RESPIRATION RATE: 17 BRPM | SYSTOLIC BLOOD PRESSURE: 218 MMHG

## 2025-06-02 DIAGNOSIS — M50.30 DDD (DEGENERATIVE DISC DISEASE), CERVICAL: ICD-10-CM

## 2025-06-02 DIAGNOSIS — S16.1XXA ACUTE STRAIN OF NECK MUSCLE, INITIAL ENCOUNTER: ICD-10-CM

## 2025-06-02 DIAGNOSIS — M62.838 SPASM OF MUSCLE: ICD-10-CM

## 2025-06-02 DIAGNOSIS — Z76.0 ENCOUNTER FOR MEDICATION REFILL: ICD-10-CM
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
## 2025-06-02 PROCEDURE — 6360000002 HC RX W HCPCS: Performed by: PHYSICIAN ASSISTANT

## 2025-06-02 PROCEDURE — 96372 THER/PROPH/DIAG INJ SC/IM: CPT

## 2025-06-02 PROCEDURE — 99284 EMERGENCY DEPT VISIT MOD MDM: CPT

## 2025-06-02 PROCEDURE — 6370000000 HC RX 637 (ALT 250 FOR IP): Performed by: PHYSICIAN ASSISTANT

## 2025-06-02 PROCEDURE — 72040 X-RAY EXAM NECK SPINE 2-3 VW: CPT

## 2025-06-02 RX ORDER — KETOROLAC TROMETHAMINE 30 MG/ML
30 INJECTION, SOLUTION INTRAMUSCULAR; INTRAVENOUS
Status: COMPLETED | OUTPATIENT
Start: 2025-06-02 | End: 2025-06-02

## 2025-06-02 RX ORDER — DICLOFENAC SODIUM 75 MG/1
75 TABLET, DELAYED RELEASE ORAL 2 TIMES DAILY
Qty: 20 TABLET | Refills: 0 | Status: SHIPPED | OUTPATIENT
Start: 2025-06-02

## 2025-06-02 RX ORDER — LISINOPRIL 10 MG/1
20 TABLET ORAL
Status: COMPLETED | OUTPATIENT
Start: 2025-06-02 | End: 2025-06-02

## 2025-06-02 RX ORDER — LISINOPRIL AND HYDROCHLOROTHIAZIDE 12.5; 2 MG/1; MG/1
1 TABLET ORAL DAILY
Qty: 30 TABLET | Refills: 0 | Status: SHIPPED | OUTPATIENT
Start: 2025-06-02

## 2025-06-02 RX ORDER — HYDROCHLOROTHIAZIDE 25 MG/1
12.5 TABLET ORAL DAILY
Status: DISCONTINUED | OUTPATIENT
Start: 2025-06-02 | End: 2025-06-02 | Stop reason: HOSPADM

## 2025-06-02 RX ORDER — METHOCARBAMOL 500 MG/1
1500 TABLET, FILM COATED ORAL
Status: COMPLETED | OUTPATIENT
Start: 2025-06-02 | End: 2025-06-02

## 2025-06-02 RX ORDER — LIDOCAINE 4 G/G
1 PATCH TOPICAL DAILY
Qty: 6 EACH | Refills: 0 | Status: SHIPPED | OUTPATIENT
Start: 2025-06-02

## 2025-06-02 RX ORDER — METHYLPREDNISOLONE 4 MG/1
4 TABLET ORAL SEE ADMIN INSTRUCTIONS
Qty: 1 KIT | Refills: 0 | Status: SHIPPED | OUTPATIENT
Start: 2025-06-02

## 2025-06-02 RX ORDER — METHOCARBAMOL 750 MG/1
750 TABLET, FILM COATED ORAL 4 TIMES DAILY
Qty: 20 TABLET | Refills: 0 | Status: SHIPPED | OUTPATIENT
Start: 2025-06-02 | End: 2025-06-07

## 2025-06-02 RX ADMIN — KETOROLAC TROMETHAMINE 30 MG: 30 INJECTION, SOLUTION INTRAMUSCULAR at 16:07

## 2025-06-02 RX ADMIN — HYDROCHLOROTHIAZIDE 12.5 MG: 25 TABLET ORAL at 16:07

## 2025-06-02 RX ADMIN — LISINOPRIL 20 MG: 10 TABLET ORAL at 16:07

## 2025-06-02 RX ADMIN — METHOCARBAMOL 1500 MG: 500 TABLET ORAL at 16:07

## 2025-06-02 ASSESSMENT — PAIN DESCRIPTION - LOCATION: LOCATION: NECK

## 2025-06-02 ASSESSMENT — PAIN DESCRIPTION - ORIENTATION: ORIENTATION: LEFT

## 2025-06-02 ASSESSMENT — PAIN - FUNCTIONAL ASSESSMENT: PAIN_FUNCTIONAL_ASSESSMENT: 0-10

## 2025-06-02 ASSESSMENT — PAIN SCALES - GENERAL
PAINLEVEL_OUTOF10: 9
PAINLEVEL_OUTOF10: 9
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
## 2025-06-22 ENCOUNTER — HOSPITAL ENCOUNTER (EMERGENCY)
Facility: HOSPITAL | Age: 60
Discharge: HOME | End: 2025-06-22
Attending: EMERGENCY MEDICINE
Payer: MEDICAID

## 2025-06-22 VITALS
SYSTOLIC BLOOD PRESSURE: 179 MMHG | HEART RATE: 66 BPM | BODY MASS INDEX: 35.85 KG/M2 | HEIGHT: 64 IN | DIASTOLIC BLOOD PRESSURE: 98 MMHG | WEIGHT: 210 LBS | RESPIRATION RATE: 18 BRPM | TEMPERATURE: 98.2 F | OXYGEN SATURATION: 99 %

## 2025-06-22 DIAGNOSIS — M62.838 MUSCLE SPASMS OF NECK: Primary | ICD-10-CM

## 2025-06-22 PROCEDURE — 99283 EMERGENCY DEPT VISIT LOW MDM: CPT

## 2025-06-22 RX ORDER — CYCLOBENZAPRINE HCL 10 MG
10 TABLET ORAL 3 TIMES DAILY PRN
Qty: 15 TABLET | Refills: 0 | Status: SHIPPED | OUTPATIENT
Start: 2025-06-22 | End: 2025-06-27

## 2025-06-22 ASSESSMENT — PAIN SCALES - GENERAL: PAINLEVEL_OUTOF10: 9

## 2025-06-22 ASSESSMENT — PAIN - FUNCTIONAL ASSESSMENT: PAIN_FUNCTIONAL_ASSESSMENT: 0-10
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
